# Patient Record
Sex: FEMALE | Race: WHITE | NOT HISPANIC OR LATINO | Employment: OTHER | URBAN - METROPOLITAN AREA
[De-identification: names, ages, dates, MRNs, and addresses within clinical notes are randomized per-mention and may not be internally consistent; named-entity substitution may affect disease eponyms.]

---

## 2022-07-22 ENCOUNTER — LAB REQUISITION (OUTPATIENT)
Dept: LAB | Facility: CLINIC | Age: 87
End: 2022-07-22
Payer: MEDICARE

## 2022-07-22 LAB
ALBUMIN UR-MCNC: NEGATIVE MG/DL
APPEARANCE UR: CLEAR
BILIRUB UR QL STRIP: NEGATIVE
COLOR UR AUTO: ABNORMAL
GLUCOSE UR STRIP-MCNC: NEGATIVE MG/DL
HGB UR QL STRIP: NEGATIVE
KETONES UR STRIP-MCNC: NEGATIVE MG/DL
LEUKOCYTE ESTERASE UR QL STRIP: NEGATIVE
MUCOUS THREADS #/AREA URNS LPF: PRESENT /LPF
NITRATE UR QL: NEGATIVE
PH UR STRIP: 6 [PH] (ref 5–7)
RBC URINE: <1 /HPF
SP GR UR STRIP: 1.02 (ref 1–1.03)
SQUAMOUS EPITHELIAL: 3 /HPF
UROBILINOGEN UR STRIP-MCNC: NORMAL MG/DL
WBC URINE: 1 /HPF

## 2022-07-22 PROCEDURE — 81001 URINALYSIS AUTO W/SCOPE: CPT | Mod: ORL | Performed by: NURSE PRACTITIONER

## 2022-09-24 ENCOUNTER — HOSPITAL ENCOUNTER (OUTPATIENT)
Facility: HOSPITAL | Age: 87
Setting detail: OBSERVATION
Discharge: NURSING, REHAB OR LONG-TERM CARE | End: 2022-09-29
Attending: EMERGENCY MEDICINE | Admitting: FAMILY MEDICINE
Payer: MEDICARE

## 2022-09-24 ENCOUNTER — APPOINTMENT (OUTPATIENT)
Dept: CT IMAGING | Facility: HOSPITAL | Age: 87
End: 2022-09-24
Attending: EMERGENCY MEDICINE
Payer: MEDICARE

## 2022-09-24 ENCOUNTER — APPOINTMENT (OUTPATIENT)
Dept: RADIOLOGY | Facility: HOSPITAL | Age: 87
End: 2022-09-24
Attending: EMERGENCY MEDICINE
Payer: MEDICARE

## 2022-09-24 ENCOUNTER — APPOINTMENT (OUTPATIENT)
Dept: RADIOLOGY | Facility: HOSPITAL | Age: 87
End: 2022-09-24
Attending: STUDENT IN AN ORGANIZED HEALTH CARE EDUCATION/TRAINING PROGRAM
Payer: MEDICARE

## 2022-09-24 DIAGNOSIS — Z79.01 ANTICOAGULATED BY ANTICOAGULATION TREATMENT: ICD-10-CM

## 2022-09-24 DIAGNOSIS — R26.2 UNABLE TO AMBULATE: ICD-10-CM

## 2022-09-24 DIAGNOSIS — M25.552 HIP PAIN, LEFT: ICD-10-CM

## 2022-09-24 DIAGNOSIS — I48.20 CHRONIC ATRIAL FIBRILLATION (H): ICD-10-CM
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
## 2022-09-24 LAB
ALBUMIN UR-MCNC: 10 MG/DL
ANION GAP SERPL CALCULATED.3IONS-SCNC: 10 MMOL/L (ref 7–15)
APPEARANCE UR: CLEAR
APTT PPP: 22 SECONDS (ref 22–38)
BASOPHILS # BLD AUTO: 0.1 10E3/UL (ref 0–0.2)
BASOPHILS NFR BLD AUTO: 0 %
BILIRUB UR QL STRIP: NEGATIVE
BUN SERPL-MCNC: 36.5 MG/DL (ref 8–23)
CALCIUM SERPL-MCNC: 8.9 MG/DL (ref 8.8–10.2)
CHLORIDE SERPL-SCNC: 106 MMOL/L (ref 98–107)
CK SERPL-CCNC: 732 U/L (ref 26–192)
COLOR UR AUTO: ABNORMAL
CREAT BLD-MCNC: 1.6 MG/DL (ref 0.6–1.1)
CREAT SERPL-MCNC: 1.47 MG/DL (ref 0.51–0.95)
DEPRECATED HCO3 PLAS-SCNC: 24 MMOL/L (ref 22–29)
EOSINOPHIL # BLD AUTO: 0 10E3/UL (ref 0–0.7)
EOSINOPHIL NFR BLD AUTO: 0 %
ERYTHROCYTE [DISTWIDTH] IN BLOOD BY AUTOMATED COUNT: 13.9 % (ref 10–15)
GFR SERPL CREATININE-BSD FRML MDRD: 31 ML/MIN/1.73M2
GFR SERPL CREATININE-BSD FRML MDRD: 34 ML/MIN/1.73M2
GLUCOSE SERPL-MCNC: 122 MG/DL (ref 70–99)
GLUCOSE UR STRIP-MCNC: NEGATIVE MG/DL
HCT VFR BLD AUTO: 39.6 % (ref 35–47)
HGB BLD-MCNC: 13 G/DL (ref 11.7–15.7)
HGB UR QL STRIP: ABNORMAL
IMM GRANULOCYTES # BLD: 0.1 10E3/UL
IMM GRANULOCYTES NFR BLD: 1 %
INR PPP: 1.02 (ref 0.85–1.15)
KETONES UR STRIP-MCNC: NEGATIVE MG/DL
LEUKOCYTE ESTERASE UR QL STRIP: NEGATIVE
LYMPHOCYTES # BLD AUTO: 0.7 10E3/UL (ref 0.8–5.3)
LYMPHOCYTES NFR BLD AUTO: 4 %
MCH RBC QN AUTO: 29.5 PG (ref 26.5–33)
MCHC RBC AUTO-ENTMCNC: 32.8 G/DL (ref 31.5–36.5)
MCV RBC AUTO: 90 FL (ref 78–100)
MONOCYTES # BLD AUTO: 1.1 10E3/UL (ref 0–1.3)
MONOCYTES NFR BLD AUTO: 6 %
NEUTROPHILS # BLD AUTO: 16.7 10E3/UL (ref 1.6–8.3)
NEUTROPHILS NFR BLD AUTO: 89 %
NITRATE UR QL: NEGATIVE
NRBC # BLD AUTO: 0 10E3/UL
NRBC BLD AUTO-RTO: 0 /100
PH UR STRIP: 6 [PH] (ref 5–7)
PLATELET # BLD AUTO: 255 10E3/UL (ref 150–450)
POTASSIUM SERPL-SCNC: 4.3 MMOL/L (ref 3.4–5.3)
RBC # BLD AUTO: 4.41 10E6/UL (ref 3.8–5.2)
RBC URINE: 1 /HPF
SARS-COV-2 RNA RESP QL NAA+PROBE: NEGATIVE
SODIUM SERPL-SCNC: 140 MMOL/L (ref 136–145)
SP GR UR STRIP: 1.03 (ref 1–1.03)
TROPONIN T SERPL HS-MCNC: 29 NG/L
TROPONIN T SERPL HS-MCNC: 32 NG/L
TROPONIN T SERPL HS-MCNC: NORMAL NG/L
UROBILINOGEN UR STRIP-MCNC: <2 MG/DL
WBC # BLD AUTO: 18.5 10E3/UL (ref 4–11)
WBC URINE: <1 /HPF

## 2022-09-24 PROCEDURE — 73560 X-RAY EXAM OF KNEE 1 OR 2: CPT | Mod: RT

## 2022-09-24 PROCEDURE — U0003 INFECTIOUS AGENT DETECTION BY NUCLEIC ACID (DNA OR RNA); SEVERE ACUTE RESPIRATORY SYNDROME CORONAVIRUS 2 (SARS-COV-2) (CORONAVIRUS DISEASE [COVID-19]), AMPLIFIED PROBE TECHNIQUE, MAKING USE OF HIGH THROUGHPUT TECHNOLOGIES AS DESCRIBED BY CMS-2020-01-R: HCPCS | Performed by: EMERGENCY MEDICINE

## 2022-09-24 PROCEDURE — 258N000003 HC RX IP 258 OP 636: Performed by: EMERGENCY MEDICINE

## 2022-09-24 PROCEDURE — 93005 ELECTROCARDIOGRAM TRACING: CPT | Performed by: EMERGENCY MEDICINE

## 2022-09-24 PROCEDURE — 250N000013 HC RX MED GY IP 250 OP 250 PS 637: Performed by: STUDENT IN AN ORGANIZED HEALTH CARE EDUCATION/TRAINING PROGRAM

## 2022-09-24 PROCEDURE — 73522 X-RAY EXAM HIPS BI 3-4 VIEWS: CPT

## 2022-09-24 PROCEDURE — 82550 ASSAY OF CK (CPK): CPT | Performed by: EMERGENCY MEDICINE

## 2022-09-24 PROCEDURE — 74174 CTA ABD&PLVS W/CONTRAST: CPT

## 2022-09-24 PROCEDURE — 96361 HYDRATE IV INFUSION ADD-ON: CPT

## 2022-09-24 PROCEDURE — G0378 HOSPITAL OBSERVATION PER HR: HCPCS

## 2022-09-24 PROCEDURE — C9803 HOPD COVID-19 SPEC COLLECT: HCPCS

## 2022-09-24 PROCEDURE — 85610 PROTHROMBIN TIME: CPT | Performed by: EMERGENCY MEDICINE

## 2022-09-24 PROCEDURE — 72125 CT NECK SPINE W/O DYE: CPT

## 2022-09-24 PROCEDURE — 36415 COLL VENOUS BLD VENIPUNCTURE: CPT | Performed by: EMERGENCY MEDICINE

## 2022-09-24 PROCEDURE — 250N000011 HC RX IP 250 OP 636: Performed by: EMERGENCY MEDICINE

## 2022-09-24 PROCEDURE — 99285 EMERGENCY DEPT VISIT HI MDM: CPT | Mod: 25

## 2022-09-24 PROCEDURE — 70450 CT HEAD/BRAIN W/O DYE: CPT

## 2022-09-24 PROCEDURE — 85730 THROMBOPLASTIN TIME PARTIAL: CPT | Performed by: EMERGENCY MEDICINE

## 2022-09-24 PROCEDURE — 85025 COMPLETE CBC W/AUTO DIFF WBC: CPT | Performed by: EMERGENCY MEDICINE

## 2022-09-24 PROCEDURE — 84484 ASSAY OF TROPONIN QUANT: CPT | Performed by: EMERGENCY MEDICINE

## 2022-09-24 PROCEDURE — 96374 THER/PROPH/DIAG INJ IV PUSH: CPT | Mod: 59

## 2022-09-24 PROCEDURE — 82565 ASSAY OF CREATININE: CPT

## 2022-09-24 PROCEDURE — 81001 URINALYSIS AUTO W/SCOPE: CPT | Performed by: EMERGENCY MEDICINE

## 2022-09-24 PROCEDURE — 82310 ASSAY OF CALCIUM: CPT | Performed by: EMERGENCY MEDICINE

## 2022-09-24 PROCEDURE — 71275 CT ANGIOGRAPHY CHEST: CPT

## 2022-09-24 PROCEDURE — 73030 X-RAY EXAM OF SHOULDER: CPT | Mod: LT

## 2022-09-24 PROCEDURE — 73610 X-RAY EXAM OF ANKLE: CPT | Mod: RT

## 2022-09-24 RX ORDER — LEVOTHYROXINE SODIUM 88 UG/1
88 TABLET ORAL DAILY
COMMUNITY

## 2022-09-24 RX ORDER — ACETAMINOPHEN 500 MG
500 TABLET ORAL EVERY 6 HOURS PRN
COMMUNITY

## 2022-09-24 RX ORDER — ONDANSETRON 2 MG/ML
4 INJECTION INTRAMUSCULAR; INTRAVENOUS EVERY 6 HOURS PRN
Status: DISCONTINUED | OUTPATIENT
Start: 2022-09-24 | End: 2022-09-29 | Stop reason: HOSPADM

## 2022-09-24 RX ORDER — PANTOPRAZOLE SODIUM 40 MG/1
40 TABLET, DELAYED RELEASE ORAL DAILY
Status: DISCONTINUED | OUTPATIENT
Start: 2022-09-24 | End: 2022-09-29 | Stop reason: HOSPADM

## 2022-09-24 RX ORDER — BISACODYL 5 MG
10 TABLET, DELAYED RELEASE (ENTERIC COATED) ORAL DAILY PRN
Status: DISCONTINUED | OUTPATIENT
Start: 2022-09-24 | End: 2022-09-29 | Stop reason: HOSPADM

## 2022-09-24 RX ORDER — LIDOCAINE 40 MG/G
CREAM TOPICAL
Status: DISCONTINUED | OUTPATIENT
Start: 2022-09-24 | End: 2022-09-29 | Stop reason: HOSPADM

## 2022-09-24 RX ORDER — BISACODYL 5 MG
5 TABLET, DELAYED RELEASE (ENTERIC COATED) ORAL DAILY PRN
Status: DISCONTINUED | OUTPATIENT
Start: 2022-09-24 | End: 2022-09-29 | Stop reason: HOSPADM

## 2022-09-24 RX ORDER — ONDANSETRON 4 MG/1
4 TABLET, ORALLY DISINTEGRATING ORAL EVERY 6 HOURS PRN
Status: DISCONTINUED | OUTPATIENT
Start: 2022-09-24 | End: 2022-09-29 | Stop reason: HOSPADM

## 2022-09-24 RX ORDER — PANTOPRAZOLE SODIUM 40 MG/1
40 TABLET, DELAYED RELEASE ORAL DAILY
COMMUNITY

## 2022-09-24 RX ORDER — IOPAMIDOL 755 MG/ML
75 INJECTION, SOLUTION INTRAVASCULAR ONCE
Status: COMPLETED | OUTPATIENT
Start: 2022-09-24 | End: 2022-09-24

## 2022-09-24 RX ORDER — ACETAMINOPHEN 325 MG/1
975 TABLET ORAL EVERY 8 HOURS
Status: DISCONTINUED | OUTPATIENT
Start: 2022-09-24 | End: 2022-09-29 | Stop reason: HOSPADM

## 2022-09-24 RX ORDER — ACETAMINOPHEN 500 MG
500 TABLET ORAL 2 TIMES DAILY
Status: ON HOLD | COMMUNITY
End: 2022-09-28

## 2022-09-24 RX ORDER — OXYCODONE HYDROCHLORIDE 5 MG/1
5 TABLET ORAL EVERY 4 HOURS PRN
Status: DISCONTINUED | OUTPATIENT
Start: 2022-09-24 | End: 2022-09-25

## 2022-09-24 RX ORDER — MULTIVITAMIN,THERAPEUTIC
1 TABLET ORAL DAILY
COMMUNITY

## 2022-09-24 RX ORDER — LANOLIN ALCOHOL/MO/W.PET/CERES
1000 CREAM (GRAM) TOPICAL DAILY
COMMUNITY

## 2022-09-24 RX ORDER — HYDROMORPHONE HYDROCHLORIDE 1 MG/ML
0.2 INJECTION, SOLUTION INTRAMUSCULAR; INTRAVENOUS; SUBCUTANEOUS ONCE
Status: COMPLETED | OUTPATIENT
Start: 2022-09-24 | End: 2022-09-24

## 2022-09-24 RX ORDER — LEVOTHYROXINE SODIUM 88 UG/1
88 TABLET ORAL DAILY
Status: DISCONTINUED | OUTPATIENT
Start: 2022-09-24 | End: 2022-09-29 | Stop reason: HOSPADM

## 2022-09-24 RX ADMIN — IOPAMIDOL 75 ML: 755 INJECTION, SOLUTION INTRAVENOUS at 08:25

## 2022-09-24 RX ADMIN — HYDROMORPHONE HYDROCHLORIDE 0.2 MG: 1 INJECTION, SOLUTION INTRAMUSCULAR; INTRAVENOUS; SUBCUTANEOUS at 11:29

## 2022-09-24 RX ADMIN — APIXABAN 2.5 MG: 2.5 TABLET, FILM COATED ORAL at 21:43

## 2022-09-24 RX ADMIN — ACETAMINOPHEN 975 MG: 325 TABLET, FILM COATED ORAL at 21:43

## 2022-09-24 RX ADMIN — SODIUM CHLORIDE 500 ML: 9 INJECTION, SOLUTION INTRAVENOUS at 08:50

## 2022-09-24 ASSESSMENT — ACTIVITIES OF DAILY LIVING (ADL)
ADLS_ACUITY_SCORE: 35
ADLS_ACUITY_SCORE: 38
ADLS_ACUITY_SCORE: 40
ADLS_ACUITY_SCORE: 38
ADLS_ACUITY_SCORE: 35
ADLS_ACUITY_SCORE: 35

## 2022-09-24 ASSESSMENT — ENCOUNTER SYMPTOMS: ARTHRALGIAS: 1

## 2022-09-24 NOTE — ED TRIAGE NOTES
She comes from an assisted living facility where she fell at some point this morning. She is not sure how long she was on the floor before she remember to push the call alert button on her body. She has dementia to some degree at baseline. She is complaining of right shoulder pain, left hip pain right knee pain and right foot pain. She denies neck and spinal pain. Denies abdominal pain. She does not know if there was LOC. she is on blood thinners so trauma alert was called.

## 2022-09-24 NOTE — ED NOTES
Pt ambulated to the bathroom at this time with the assistance of a walker. Pt very slow and unsteady. Pt is also quick to fatigue while walking.

## 2022-09-24 NOTE — PROGRESS NOTES
Chart reviewed, spoke to pt and dtr Carolyn Andrews, pt lives at [facility], unable to return there if she continues to fall (fell twice and was found hours later, unsure how long), dtr Carolyn concern about her returning there if she is unable to get up and use walker on her own due to pain in legs/knees, and would work with facility to increase svcs if she can get to or near baseline to go home w/PT/OT. Discussed SANTANA. Left  w/facility [facility]LIN[facility] Vazquez [phone].

## 2022-09-24 NOTE — PHARMACY-ADMISSION MEDICATION HISTORY
Pharmacy Note - Admission Medication History    Pertinent Provider Information: all information is from MAR from facility ([facility])     ______________________________________________________________________    Prior To Admission (PTA) med list completed and updated in EMR.       PTA Med List   Medication Sig Last Dose     acetaminophen (TYLENOL) 500 MG tablet Take 500 mg by mouth 2 times daily 9/23/2022 at 2100     acetaminophen (TYLENOL) 500 MG tablet Take 500 mg by mouth every 6 hours as needed for pain 9/23/2022 at 1815     apixaban ANTICOAGULANT (ELIQUIS) 2.5 MG tablet Take 2.5 mg by mouth 2 times daily 9/23/2022 at 2100     calcium carbonate 600 mg-vitamin D 400 units (CALTRATE) 600-400 MG-UNIT per tablet Take 1 tablet by mouth 2 times daily 9/23/2022 at 2100     cyanocobalamin (VITAMIN B-12) 1000 MCG tablet Take 1,000 mcg by mouth daily 9/23/2022 at 1100     levothyroxine (SYNTHROID/LEVOTHROID) 88 MCG tablet Take 88 mcg by mouth daily 9/23/2022 at 1100     melatonin 5 MG tablet Take 5 mg by mouth At Bedtime 9/23/2022 at 2100     Menthol, Topical Analgesic, (BIOFREEZE) 4 % GEL Externally apply 1 Application topically 2 times daily To right knee 9/23/2022 at 2100     multivitamin, therapeutic (THERA-VIT) TABS tablet Take 1 tablet by mouth daily 9/23/2022 at 1100     pantoprazole (PROTONIX) 40 MG EC tablet Take 40 mg by mouth daily 9/23/2022 at 1100       Information source(s): Facility ([facility]/) medication list/MAR  Method of interview communication: N/A    Summary of Changes to PTA Med List  New: ALL  Discontinued: bupropion  Changed: none    Patient was asked about OTC/herbal products specifically.  PTA med list reflects this.    In the past week, patient estimated taking medication this percent of the time:  greater than 90%.    Allergies were reviewed, assessed, and updated with the patient.      Patient did not bring any medications to the hospital and can't retrieve from home. No multi-dose  medications are available for use during hospital stay.     The information provided in this note is only as accurate as the sources available at the time of the update(s).    Thank you for the opportunity to participate in the care of this patient.    Jacqueline Miles  9/24/2022 12:59 PM

## 2022-09-24 NOTE — ED PROVIDER NOTES
EMERGENCY DEPARTMENT ENCOUNTER      NAME: Marycarmen Warren  AGE: 87 year old female  YOB: 1935  MRN: [id number]  EVALUATION DATE & TIME: 9/24/2022  7:20 AM    PCP: No primary care provider on file.    ED PROVIDER: Denise Roberts M.D.      CHIEF COMPLAINT     Chief Complaint   Patient presents with     Fall         FINAL IMPRESSION:     1. Fall, initial encounter    2. Chronic atrial fibrillation (H)    3. Anticoagulated by anticoagulation treatment    4. Hip pain, left    5. Unable to ambulate          MEDICAL DECISION MAKING:       Pertinent Labs & Imaging studies reviewed. (See chart for details)    87 year old female presents to the Emergency Department for evaluation of injuries after a fall.    ED Course as of 09/24/22 1523   Sat Sep 24, 2022   0737 Mrs. Warren  is a 86 yo female who presents via EMS for fall.  History by EMS patient and  nursing home.   0738 Patient states she fell.  She thinks she tripped  She states she was down on floor for 2 hours and forgot to use her alarm.   0739 She states she has been doing well no recent chest pain shortness of breath vomiting or diarrhea.   0740 Per EMS patient is on Eliquis.  Her records reveal that she is DNR.  She does have a history of   0740 Nursing home records reveal a history of A. fib on Eliquis history of falling history of chronic kidney disease depression short-term memory loss.   0740 Semination she is nontoxic awake and alert.  She received fentanyl prior to arrival.  No evidence of head trauma no hyphema no hemotympanum no midline cervical tenderness palpation she is in a cervical collar will leave on given age.  She has tenderness palpation of the left shoulder left hip right knee right ankle.  No chest wall or abdominal tenderness palpation no midline thoracic or lumbar things for the patient.   0741 Differential diagnoses include but not limited accidental fall syncope   0741 IV already established by EMS.  Will image head C-spine  chest and abdomen and shoulder hip knee and ankle.  We will contact nursing home and family members.   0906 Daughter at bedside confirms that patient has fallen twice.   0906 Troponin T, High Sensitivity (now)   0907 CT chest no acute trauma.   0911 C-spine cleared clinically and radiologically.  Daughter states that she would like patient to go to some type of rehabilitation.  Will contact care manager with help regarding that.   0940 Elevated white blood cell count.  Creatinine of 1.47.  Troponin T 21 we will repeat in 2 hours.   1027 UA negative.   1102 Attempted to ambulate patient and she is unable to given Tdap complains of left inguinal hip pain spoke with radiology is no gross fracture.  Will admit for PT OT further evaluation of falls.   1129 Admitting team at bedside.   1130 Clinical impression and decision making     1130 Clinical impression and decision making history of atrial fibrillation anticoagulated on Thursday 1 on Friday.  Unknown what happened.  Trauma evaluation negative for acute head neck thoracic abdominal or hip fracture unable to ambulate secondary to hip pain daughter concerned that she is currently only in assisted living and wants her to go to TCU.  Will admit for pain control PT OT and further placement.  Admitted in stable condition.   1311 Addendum on delta troponin is 32 patient denies any chest pain or shortness of breath.  Will be admitted for a third troponin.       Vital Signs: Hypertension  EKG: Irregular baseline.  Imaging: Head CT C-spine CT chest and abdomen no acute trauma.  Shoulder knee and ankle no fracture  Home Meds: reviewee  ED meds/abx: dialudid  Fluids: ns    Labs  K 4.3  Cr 1.47  Wbc 18.5  Hgb 13  Platelets 255      Review of Previous Records        Consults  admitting Dr. Ruffin.  ED COURSE     7:17 AM I met with the patient, obtained history, performed an initial exam, and discussed options and plan for diagnostics and treatment here in the ED.    8:53 AM  Patient's daughter arrived.    11:07 AM I spoke with , Dr. Ruffin.  Meeting physician.    1:12 PM reevaluated and updated.  Currently boarding in the emergency department.  Admitting team at bedside.  Same for care manager.    At the conclusion of the encounter I discussed the results of all of the tests and the disposition. The questions were answered. The patient acknowledged understanding and was agreeable with the care plan.           MEDICATIONS GIVEN IN THE EMERGENCY:     Medications   sodium chloride (PF) 0.9% PF flush 3 mL (has no administration in time range)   apixaban ANTICOAGULANT (ELIQUIS) tablet 2.5 mg (has no administration in time range)   levothyroxine (SYNTHROID/LEVOTHROID) tablet 88 mcg (has no administration in time range)   pantoprazole (PROTONIX) EC tablet 40 mg (has no administration in time range)   lidocaine 1 % 0.1-1 mL (has no administration in time range)   lidocaine (LMX4) cream (has no administration in time range)   sodium chloride (PF) 0.9% PF flush 3 mL (has no administration in time range)   sodium chloride (PF) 0.9% PF flush 3 mL (has no administration in time range)   melatonin tablet 1 mg (has no administration in time range)   Patient is already receiving anticoagulation with heparin, enoxaparin (LOVENOX), warfarin (COUMADIN)  or other anticoagulant medication (has no administration in time range)   acetaminophen (TYLENOL) tablet 975 mg (has no administration in time range)   bisacodyl (DULCOLAX) EC tablet 5 mg (has no administration in time range)     Or   bisacodyl (DULCOLAX) EC tablet 10 mg (has no administration in time range)   ondansetron (ZOFRAN ODT) ODT tab 4 mg (has no administration in time range)     Or   ondansetron (ZOFRAN) injection 4 mg (has no administration in time range)   oxyCODONE (ROXICODONE) tablet 5 mg (has no administration in time range)   0.9% sodium chloride BOLUS (0 mLs Intravenous Stopped 9/24/22 1020)   iopamidol (ISOVUE-370) solution 75 mL (75  mLs Intravenous Given 9/24/22 0830)   HYDROmorphone (PF) (DILAUDID) injection 0.2 mg (0.2 mg Intravenous Given 9/24/22 1129)       NEW PRESCRIPTIONS STARTED AT TODAY'S ER VISIT     New Prescriptions    No medications on file          =================================================================    HPI     Patient information was obtained from: Patient    Use of : N/A    Marycarmen Warren is a 87 year old female who presents by ambulance for evaluation of injuries after a fall.     Per EMS, the patient is coming from [facility] had an unwitnessed fall this morning (9/24) and had been found at least two hours after her fall.  She has cervical spine pain but no neurological deficits. She is currently on Eliquis and was given 50 mg fentanyl for her pain, which decreased her pain from a 6-10 down to a 1-2.     Between Thursday 9/22 and Friday 9/23 it was found that near her kitchen with some stool in the trash can and it appears she has been using trash bags to clean herself. Her daughter had checked in on her around 15:00 on Friday (9/23), and she had fallen again between last night and today.     Per the patient, she had fallen this morning (9/24), and she thinks she tripped but wasn't sure. She had forgotten to press the call button and wasn't helped until a couple hours after the fall. She endorses right shoulder, knee and ankle pain, as well left hip pain. Patient has a history of falls, gait problem, atrial fibrillation, and is anticoagulated. The patient is DNR. No other complaints at this time.      REVIEW OF SYSTEMS   Review of Systems   Musculoskeletal: Positive for arthralgias (right knee, shoulder and ankle pain ).   All other systems reviewed and are negative.       PAST MEDICAL HISTORY:   No past medical history on file.    PAST SURGICAL HISTORY:   No past surgical history on file.      CURRENT MEDICATIONS:   acetaminophen (TYLENOL) 500 MG tablet  acetaminophen (TYLENOL) 500 MG  tablet  apixaban ANTICOAGULANT (ELIQUIS) 2.5 MG tablet  calcium carbonate 600 mg-vitamin D 400 units (CALTRATE) 600-400 MG-UNIT per tablet  cyanocobalamin (VITAMIN B-12) 1000 MCG tablet  levothyroxine (SYNTHROID/LEVOTHROID) 88 MCG tablet  melatonin 5 MG tablet  Menthol, Topical Analgesic, (BIOFREEZE) 4 % GEL  multivitamin, therapeutic (THERA-VIT) TABS tablet  pantoprazole (PROTONIX) 40 MG EC tablet         ALLERGIES:   No Known Allergies    FAMILY HISTORY:   No family history on file.    SOCIAL HISTORY:        VITALS:   BP (!) 155/79   Pulse 81   Temp 97.9  F (36.6  C) (Oral)   Resp 18   SpO2 93%     PHYSICAL EXAM     Physical Exam  Vitals and nursing note reviewed. Exam conducted with a chaperone present.   Constitutional:       Appearance: Normal appearance.   HENT:      Head: Normocephalic and atraumatic.      Ears:      Comments: No hemotympanum  Eyes:      Comments: No hyphema   Musculoskeletal:         General: Tenderness present.      Comments: Right knee, right ankle.  Full range of motion of the knee and the ankle no hemarthrosis.  Left hip tenderness palpation with full range of motion   Neurological:      Mental Status: She is alert.         Physical Exam   Constitutional: Fairly oriented times self on a cervical collar    Head: Atraumatic.     Nose: Nose normal.     Mouth/Throat: Oropharynx is clear and moist.     Eyes: EOM are normal. Pupils are equal, round, and reactive to light.  No hyphema    Ears: No hemotympanum    Neck: Normal range of motion. Neck supple.  No midline cervical tenderness palpation    Cardiovascular: Normal rate, regular rhythm and normal heart sounds.      Pulmonary/Chest: Normal effort  and breath sounds normal.     Abdominal: Nontender    Musculoskeletal:  Right ankle, right knee tenderness. Surgical scar on left knee.  Left hip tenderness but    Neurological: Alert and oriented x2.    Lymphatics: Trace pedal edema.    : NA    Skin: Skin is warm and dry.     Psychiatric:  Normal mood and affect. Behavior is normal.       LAB:     All pertinent labs reviewed and interpreted.  Labs Ordered and Resulted from Time of ED Arrival to Time of ED Departure   BASIC METABOLIC PANEL - Abnormal       Result Value    Sodium 140      Potassium 4.3      Chloride 106      Carbon Dioxide (CO2) 24      Anion Gap 10      Urea Nitrogen 36.5 (*)     Creatinine 1.47 (*)     Calcium 8.9      Glucose 122 (*)     GFR Estimate 34 (*)    CBC WITH PLATELETS AND DIFFERENTIAL - Abnormal    WBC Count 18.5 (*)     RBC Count 4.41      Hemoglobin 13.0      Hematocrit 39.6      MCV 90      MCH 29.5      MCHC 32.8      RDW 13.9      Platelet Count 255      % Neutrophils 89      % Lymphocytes 4      % Monocytes 6      % Eosinophils 0      % Basophils 0      % Immature Granulocytes 1      NRBCs per 100 WBC 0      Absolute Neutrophils 16.7 (*)     Absolute Lymphocytes 0.7 (*)     Absolute Monocytes 1.1      Absolute Eosinophils 0.0      Absolute Basophils 0.1      Absolute Immature Granulocytes 0.1      Absolute NRBCs 0.0     ROUTINE UA WITH MICROSCOPIC REFLEX TO CULTURE - Abnormal    Color Urine Light Yellow      Appearance Urine Clear      Glucose Urine Negative      Bilirubin Urine Negative      Ketones Urine Negative      Specific Gravity Urine 1.032 (*)     Blood Urine 0.2 mg/dL (*)     pH Urine 6.0      Protein Albumin Urine 10  (*)     Urobilinogen Urine <2.0      Nitrite Urine Negative      Leukocyte Esterase Urine Negative      RBC Urine 1      WBC Urine <1     ISTAT CREATININE POCT - Abnormal    Creatinine POCT 1.6 (*)     GFR, ESTIMATED POCT 31 (*)    TROPONIN T, HIGH SENSITIVITY - Abnormal    Troponin T, High Sensitivity 29 (*)    TROPONIN T, HIGH SENSITIVITY - Abnormal    Troponin T, High Sensitivity 32 (*)    CK TOTAL - Abnormal     (*)    INR - Normal    INR 1.02     PARTIAL THROMBOPLASTIN TIME - Normal    aPTT 22     COVID-19 VIRUS (CORONAVIRUS) BY PCR - Normal    SARS CoV2 PCR Negative     TROPONIN  T, HIGH SENSITIVITY    Troponin T, High Sensitivity            RADIOLOGY:     Reviewed all pertinent imaging. Please see official radiology report.  Ankle XR, G/E 3 views, right   Final Result   IMPRESSION:   1.  Normal joint spacing and alignment.   2.  No fracture.   3.  Bone demineralization.      XR Knee Right 1/2 Views   Final Result   IMPRESSION:   1.  No fracture or joint malalignment.   2.  Mild right knee degenerative arthrosis. Mild medial compartment narrowing. Mild tricompartmental marginal osteophytosis. No joint effusion.   3.  Bone demineralization.      XR Shoulder Left 2 Views   Final Result   IMPRESSION:   1.  Partially limited evaluation due to positioning.   2.  No evidence of acute fracture or joint malalignment.   3.  Multiple old healed left rib fractures.   4.  Bone demineralization.         CTA Chest Abdomen Pelvis w Contrast   Final Result   IMPRESSION:   1.  No evidence of acute traumatic injury to the chest, abdomen or pelvis.   2.  There is a large rectocele.   3.  There is a small hiatal hernia.   4.  Cholelithiasis.   5.  Evidence of previous granulomatous disease.         Cervical spine CT w/o contrast   Final Result   IMPRESSION:   HEAD CT:   1.  No acute intracranial process.   2.  Mild to moderate chronic small vessel ischemic disease and generalized brain parenchymal volume loss.      CERVICAL SPINE CT:   1.  Age-indeterminate mild to moderate T2 superior endplate compression fracture.   2.  No fracture or traumatic subluxation of the cervical spine.   3.  Multilevel cervical spondylosis with degenerative spondylolisthesis.         Head CT w/o contrast   Final Result   IMPRESSION:   HEAD CT:   1.  No acute intracranial process.   2.  Mild to moderate chronic small vessel ischemic disease and generalized brain parenchymal volume loss.      CERVICAL SPINE CT:   1.  Age-indeterminate mild to moderate T2 superior endplate compression fracture.   2.  No fracture or traumatic  subluxation of the cervical spine.   3.  Multilevel cervical spondylosis with degenerative spondylolisthesis.         XR Hip Bilateral 1 View Each    (Results Pending)        EKG:     EKG #1   very irregular baseline I see P waves seems sinus with sinus arrhythmia.    Time:093905    Ventricular rate 80 bmp  Axis normal  VT interval 120 ms  QRS duration 86 ms  QT//449 ms    Compared to previous EKG on no previous available for comparison  I have independently reviewed and interpreted the EKG(s) documented above.      PROCEDURES:     Procedures      I, Franco Santoro, am serving as a scribe to document services personally performed by Dr. Roberts based on my observation and the provider's statements to me. I, Denise Roberts MD attest that Franco Santoro is acting in a scribe capacity, has observed my performance of the services and has documented them in accordance with my direction.    Denise Roberts M.D.  Emergency Medicine  [facility] EMERGENCY DEPARTMENT  [facility] [facility] [id number] [phone]  Dept: [phone]     Denise Roberts MD  09/24/22 3985

## 2022-09-24 NOTE — ED NOTES
PT resting comfortably with family at bedside, complains of pain in left shoulder but reports that it is feeling better since pain medications. HTN noted, MD updated.

## 2022-09-25 ENCOUNTER — APPOINTMENT (OUTPATIENT)
Dept: OCCUPATIONAL THERAPY | Facility: HOSPITAL | Age: 87
End: 2022-09-25
Attending: STUDENT IN AN ORGANIZED HEALTH CARE EDUCATION/TRAINING PROGRAM
Payer: MEDICARE

## 2022-09-25 ENCOUNTER — APPOINTMENT (OUTPATIENT)
Dept: CT IMAGING | Facility: HOSPITAL | Age: 87
End: 2022-09-25
Attending: FAMILY MEDICINE
Payer: MEDICARE

## 2022-09-25 ENCOUNTER — APPOINTMENT (OUTPATIENT)
Dept: PHYSICAL THERAPY | Facility: HOSPITAL | Age: 87
End: 2022-09-25
Attending: STUDENT IN AN ORGANIZED HEALTH CARE EDUCATION/TRAINING PROGRAM
Payer: MEDICARE

## 2022-09-25 LAB
ANION GAP SERPL CALCULATED.3IONS-SCNC: 9 MMOL/L (ref 7–15)
BUN SERPL-MCNC: 40.9 MG/DL (ref 8–23)
CALCIUM SERPL-MCNC: 8.5 MG/DL (ref 8.8–10.2)
CHLORIDE SERPL-SCNC: 107 MMOL/L (ref 98–107)
CREAT SERPL-MCNC: 1.26 MG/DL (ref 0.51–0.95)
DEPRECATED HCO3 PLAS-SCNC: 24 MMOL/L (ref 22–29)
ERYTHROCYTE [DISTWIDTH] IN BLOOD BY AUTOMATED COUNT: 14.1 % (ref 10–15)
GFR SERPL CREATININE-BSD FRML MDRD: 41 ML/MIN/1.73M2
GLUCOSE SERPL-MCNC: 106 MG/DL (ref 70–99)
HCT VFR BLD AUTO: 32 % (ref 35–47)
HGB BLD-MCNC: 10.5 G/DL (ref 11.7–15.7)
MCH RBC QN AUTO: 29.3 PG (ref 26.5–33)
MCHC RBC AUTO-ENTMCNC: 32.8 G/DL (ref 31.5–36.5)
MCV RBC AUTO: 89 FL (ref 78–100)
PLATELET # BLD AUTO: 182 10E3/UL (ref 150–450)
POTASSIUM SERPL-SCNC: 3.9 MMOL/L (ref 3.4–5.3)
RBC # BLD AUTO: 3.58 10E6/UL (ref 3.8–5.2)
SODIUM SERPL-SCNC: 140 MMOL/L (ref 136–145)
WBC # BLD AUTO: 8.2 10E3/UL (ref 4–11)

## 2022-09-25 PROCEDURE — 36415 COLL VENOUS BLD VENIPUNCTURE: CPT | Performed by: STUDENT IN AN ORGANIZED HEALTH CARE EDUCATION/TRAINING PROGRAM

## 2022-09-25 PROCEDURE — 70450 CT HEAD/BRAIN W/O DYE: CPT

## 2022-09-25 PROCEDURE — 97161 PT EVAL LOW COMPLEX 20 MIN: CPT | Mod: GP

## 2022-09-25 PROCEDURE — 99204 OFFICE O/P NEW MOD 45 MIN: CPT | Performed by: PHYSICIAN ASSISTANT

## 2022-09-25 PROCEDURE — 82310 ASSAY OF CALCIUM: CPT | Performed by: STUDENT IN AN ORGANIZED HEALTH CARE EDUCATION/TRAINING PROGRAM

## 2022-09-25 PROCEDURE — G0378 HOSPITAL OBSERVATION PER HR: HCPCS

## 2022-09-25 PROCEDURE — 97530 THERAPEUTIC ACTIVITIES: CPT | Mod: GP

## 2022-09-25 PROCEDURE — 250N000013 HC RX MED GY IP 250 OP 250 PS 637: Performed by: STUDENT IN AN ORGANIZED HEALTH CARE EDUCATION/TRAINING PROGRAM

## 2022-09-25 PROCEDURE — 97166 OT EVAL MOD COMPLEX 45 MIN: CPT | Mod: GO

## 2022-09-25 PROCEDURE — 85027 COMPLETE CBC AUTOMATED: CPT | Performed by: STUDENT IN AN ORGANIZED HEALTH CARE EDUCATION/TRAINING PROGRAM

## 2022-09-25 PROCEDURE — 97530 THERAPEUTIC ACTIVITIES: CPT | Mod: GO

## 2022-09-25 PROCEDURE — 99219 PR INITIAL OBSERVATION CARE,LEVEL II: CPT | Mod: GC | Performed by: STUDENT IN AN ORGANIZED HEALTH CARE EDUCATION/TRAINING PROGRAM

## 2022-09-25 RX ORDER — LIDOCAINE 4 G/G
1 PATCH TOPICAL
Status: DISCONTINUED | OUTPATIENT
Start: 2022-09-25 | End: 2022-09-29 | Stop reason: HOSPADM

## 2022-09-25 RX ORDER — ACETAMINOPHEN 325 MG/1
325 TABLET ORAL EVERY 4 HOURS PRN
Status: DISCONTINUED | OUTPATIENT
Start: 2022-09-25 | End: 2022-09-29 | Stop reason: HOSPADM

## 2022-09-25 RX ADMIN — APIXABAN 2.5 MG: 2.5 TABLET, FILM COATED ORAL at 19:39

## 2022-09-25 RX ADMIN — LIDOCAINE PATCH 4% 1 PATCH: 40 PATCH TOPICAL at 13:15

## 2022-09-25 RX ADMIN — ACETAMINOPHEN 975 MG: 325 TABLET, FILM COATED ORAL at 14:56

## 2022-09-25 RX ADMIN — LEVOTHYROXINE SODIUM 88 MCG: 88 TABLET ORAL at 10:02

## 2022-09-25 RX ADMIN — ACETAMINOPHEN 975 MG: 325 TABLET, FILM COATED ORAL at 06:06

## 2022-09-25 RX ADMIN — PANTOPRAZOLE SODIUM 40 MG: 40 TABLET, DELAYED RELEASE ORAL at 10:02

## 2022-09-25 RX ADMIN — OXYCODONE HYDROCHLORIDE 5 MG: 5 TABLET ORAL at 10:09

## 2022-09-25 RX ADMIN — ACETAMINOPHEN 975 MG: 325 TABLET, FILM COATED ORAL at 22:16

## 2022-09-25 RX ADMIN — APIXABAN 2.5 MG: 2.5 TABLET, FILM COATED ORAL at 10:01

## 2022-09-25 ASSESSMENT — ACTIVITIES OF DAILY LIVING (ADL)
ADLS_ACUITY_SCORE: 40
ADLS_ACUITY_SCORE: 39
ADLS_ACUITY_SCORE: 35
ADLS_ACUITY_SCORE: 39
ADLS_ACUITY_SCORE: 40
ADLS_ACUITY_SCORE: 35
ADLS_ACUITY_SCORE: 39
ADLS_ACUITY_SCORE: 40
ADLS_ACUITY_SCORE: 35

## 2022-09-25 NOTE — PLAN OF CARE
"PRIMARY DIAGNOSIS: \"GENERIC\" NURSING  OUTPATIENT/OBSERVATION GOALS TO BE MET BEFORE DISCHARGE:  ADLs back to baseline: No    Activity and level of assistance: Up with maximum assistance. Consider SW and/or PT evaluation.     Pain status: Improved-controlled with oral pain medications.    Return to near baseline physical activity: No     Discharge Planner Nurse   Safe discharge environment identified: No  Barriers to discharge: Yes       Entered by: Myles Reid RN 09/25/2022 3:50 PM     Please review provider order for any additional goals.   Nurse to notify provider when observation goals have been met and patient is ready for discharge.Goal Outcome Evaluation:                      "

## 2022-09-25 NOTE — PLAN OF CARE
PRIMARY DIAGNOSIS: GENERALIZED WEAKNESS    OUTPATIENT/OBSERVATION GOALS TO BE MET BEFORE DISCHARGE  1. Orthostatic performed: No    2. Tolerating PO medications: Yes    3. Return to near baseline physical activity: No    4. Cleared for discharge by consultants (if involved): No    Discharge Planner Nurse   Safe discharge environment identified: No  Barriers to discharge: Yes       Entered by: Ana Rainey RN 09/25/2022 1:04 AM   Awaiting PT/OT consults. Ax1-2 with transfers and ambulation, pt weak and unsteady. Requires use of gait belt and walker. Orientation fluctuates to place and time, needs reorientation and frequent reminders.   Please review provider order for any additional goals.   Nurse to notify provider when observation goals have been met and patient is ready for discharge.Goal Outcome Evaluation:

## 2022-09-25 NOTE — PLAN OF CARE
[facility]      OUTPATIENT OCCUPATIONAL THERAPY  EVALUATION  PLAN OF TREATMENT FOR OUTPATIENT REHABILITATION  (COMPLETE FOR INITIAL CLAIMS ONLY)  Patient's Last Name, First Name, M.I.  YOB: 1935  Marycarmen Warren                          Provider's Name  [facility] Medical Record No.  [id number]                               Onset Date:  09/24/22   Start of Care Date:  09/25/22     Type:     ___PT   _X_OT   ___SLP Medical Diagnosis:  Fall, weakness, impaired ADLs                        OT Diagnosis:  Marycarmen Warren is a 87 year old female admitted on 9/24/2022. She has a history of A. fib on anticoagulation and hypothyroidism and is admitted after a mechanical fall.   Visits from SOC:  1   _________________________________________________________________________________  Plan of Treatment/Functional Goals    Planned Interventions: ADL retraining, balance training, strengthening, ROM, cognition, home program guidelines, transfer training, progressive activity/exercise   Goals: See Occupational Therapy Goals on Care Plan in [facility] electronic health record.    Therapy Frequency: 3 times/wk  Predicted Duration of Therapy Intervention: 09/29/22  _________________________________________________________________________________    I CERTIFY THE NEED FOR THESE SERVICES FURNISHED UNDER        THIS PLAN OF TREATMENT AND WHILE UNDER MY CARE     (Physician co-signature of this document indicates review and certification of the therapy plan).              Certification date from: 09/25/22, Certification date to: 09/29/22    Referring Physician: Sintia Ruffin MD            Initial Assessment        See Occupational Therapy evaluation dated 09/25/22 in Epic electronic health record.        Bia Vaz OTR/L  9/25/2022

## 2022-09-25 NOTE — PLAN OF CARE
PRIMARY DIAGNOSIS: GENERALIZED WEAKNESS    OUTPATIENT/OBSERVATION GOALS TO BE MET BEFORE DISCHARGE  1. Orthostatic performed: No, pt unable to d/t pain    2. Tolerating PO medications: Yes    3. Return to near baseline physical activity: No    4. Cleared for discharge by consultants (if involved): No    Discharge Planner Nurse   Safe discharge environment identified: No  Barriers to discharge: Yes       Entered by: Isis Skaggs RN 09/25/2022 12:21 PM     Please review provider order for any additional goals.   Nurse to notify provider when observation goals have been met and patient is ready for discharge.

## 2022-09-25 NOTE — CONSULTS
[facility]    Neurosurgery Consultation     Date of Admission:  9/24/2022  Date of Consult (When I saw the patient): 09/25/22    Assessment & Plan   Marycarmen Warren is a 87 year old female who was admitted on 9/24/2022. I was asked to see the patient for generalized pain status post fall.    Active Problems:  T2 fracture     Plan:   No brace indicated  Okay to increase activity as tolerated with PT/OT  May need further workup of severe left hip pain with CT pelvis vs hip xrays  Will follow peripherally as there is no current neurosurgical intervention indicated   Would benefit from outpatient DEXA scan and treatment of possible osteoporosis       I have discussed the following assessment and plan with Dr. Yates who is in agreement with initial plan and will follow up with further consultation recommendations.    JAQUELINE Pna [facility] Neurosurgery  O: [phone]        Code Status    No CPR- Do NOT Intubate    Reason for Consult   Reason for consult: I was asked by Dr. Kuhn to evaluate this patient for back pain status post fall T2 fracture     Primary Care Physician   Gabe Caban MD    Chief Complaint   Left shoulder and hip pain generalized weakness     History is obtained from the patient    History of Present Illness   Marycarmen Warren is a 87 year old female who presents with history of A. fib on anticoagulation and hypothyroidism presented to ED via ambulance after a mechanical fall Saturday. She is present with her daughter and daughter states that she has had two falls recently one Friday and one Saturday. She is unsure how to she fell and feels that she might of misstepped. She states that she fell backwards and has a life alert but forgot to hit the button. Her daughter worries that her memory is getting worst and needs to be moved to a higher level of care in her facility. After her fall on Saturday she was on the floor for many hours and notes to  have CK of 732. She has complaint of pain all over and is very sensative to touch on her left upper and lower extremity. She denies any neck or upper thoracic pain presently. She has complaint of severe left shoulder and left hip joint pain and tenderness with limited ROM. She denies any headaches, nausea, emesis, or visual changes. She denies any changes in bowel or bladder habits since the fall. She has worsen depends for many years and daughter states that over the past few months she has been going through her supply quicker.       Past Medical History   I have reviewed this patient's medical history and updated it with pertinent information if needed.   No past medical history on file.    Past Surgical History   I have reviewed this patient's surgical history and updated it with pertinent information if needed.  No past surgical history on file.    Prior to Admission Medications   Prior to Admission Medications   Prescriptions Last Dose Informant Patient Reported? Taking?   Menthol, Topical Analgesic, (BIOFREEZE) 4 % GEL 9/23/2022 at 2100  Yes Yes   Sig: Externally apply 1 Application topically 2 times daily To right knee   acetaminophen (TYLENOL) 500 MG tablet 9/23/2022 at 2100  Yes Yes   Sig: Take 500 mg by mouth 2 times daily   acetaminophen (TYLENOL) 500 MG tablet 9/23/2022 at 1815  Yes Yes   Sig: Take 500 mg by mouth every 6 hours as needed for pain   apixaban ANTICOAGULANT (ELIQUIS) 2.5 MG tablet 9/23/2022 at 2100  Yes Yes   Sig: Take 2.5 mg by mouth 2 times daily   calcium carbonate 600 mg-vitamin D 400 units (CALTRATE) 600-400 MG-UNIT per tablet 9/23/2022 at 2100  Yes Yes   Sig: Take 1 tablet by mouth 2 times daily   cyanocobalamin (VITAMIN B-12) 1000 MCG tablet 9/23/2022 at 1100  Yes Yes   Sig: Take 1,000 mcg by mouth daily   levothyroxine (SYNTHROID/LEVOTHROID) 88 MCG tablet 9/23/2022 at 1100  Yes Yes   Sig: Take 88 mcg by mouth daily   melatonin 5 MG tablet 9/23/2022 at 2100  Yes Yes   Sig: Take 5 mg  "by mouth At Bedtime   multivitamin, therapeutic (THERA-VIT) TABS tablet 9/23/2022 at 1100  Yes Yes   Sig: Take 1 tablet by mouth daily   pantoprazole (PROTONIX) 40 MG EC tablet 9/23/2022 at 1100  Yes Yes   Sig: Take 40 mg by mouth daily      Facility-Administered Medications: None     Allergies   No Known Allergies    Social History   I have reviewed this patient's social history and updated it with pertinent information if needed. Marycarmen Warren      Family History   Family history reviewed with patient and is noncontributory.    Review of Systems   14 point review of systems negative with exception to HPI     Physical Exam   Temp: 98.3  F (36.8  C) Temp src: Oral BP: (!) 148/68 Pulse: 71   Resp: 18 SpO2: 97 % O2 Device: None (Room air)    Vital Signs with Ranges  Temp:  [98.3  F (36.8  C)-99  F (37.2  C)] 98.3  F (36.8  C)  Pulse:  [71-87] 71  Resp:  [16-18] 18  BP: (124-148)/(61-70) 148/68  SpO2:  [96 %-97 %] 97 %  161 lbs 6.03 oz     , Blood pressure (!) 148/68, pulse 71, temperature 98.3  F (36.8  C), temperature source Oral, resp. rate 18, height 1.676 m (5' 6\"), weight 73.2 kg (161 lb 6 oz), SpO2 97 %.  161 lbs 6.03 oz  HEENT:  Normocephalic, atraumatic.  PERRLA.  EOM s intact.   Neck:  Supple, non-tender, without lymphadenopathy.  Heart:  No peripheral edema  Lungs:  No SOB  Abdomen:  Soft, non-tender, non-distended.  Normal bowel sounds.  Skin:  Warm and dry, good capillary refill.  Extremities:  Good radial and dorsalis pedis pulses bilaterally, no edema, cyanosis or clubbing.    NEUROLOGICAL EXAMINATION:   Mental status:  Alert and Oriented x 3, speech is fluent.  Cranial nerves:  II-XII intact.   Motor:  Strength is 5/5 throughout the upper and lower extremities  Deltoids:  Right: 5/5   Left:  5/5  Biceps:  Right: 5/5   Left:  5/5  Triceps: Right: 5/5   Left:  5/5                       Wrist Extensors: Right: 5/5   Left:  5/5        Wrist Flexors:Right: 5/5   Left:  5/5             Hand : Right: 5/5 "   Left:  5/5         Hip Flexor: Right: 5/5   Left:  5/5                 Hip Adductor:Right: 5/5   Left:  5/5               Hip Abductor: Right: 5/5   Left:  5/5              Gastroc Soleus:Right: 5/5   Left:  5/5        Tib/Ant:Right: 5/5   Left:  5/5                        EHL:Right: 5/5   Left:  5/5                     Sensation:  Intact to LT throughout   Reflexes:  Negative Babinski.  Negative Clonus.    Coordination:  Smooth finger to nose testing.   Negative pronator drift.   Gait:  Not tested     Cervical examination reveals good range of motion.  No tenderness to palpation of the cervical spine or paraspinous muscles bilaterally.     Lumbar examination reveals no tenderness of the spine or paraspinous muscles.       Limited ROM passive and active in left shoulder and left hip with pain with straight leg raise on left into hip  Pain to palpation of left shoulder and hip, pain to palpation of feet bilaterally (L>R)      Data     CBC RESULTS:   Recent Labs   Lab Test 09/25/22  0750   WBC 8.2   RBC 3.58*   HGB 10.5*   HCT 32.0*   MCV 89   MCH 29.3   MCHC 32.8   RDW 14.1        Basic Metabolic Panel:  Lab Results   Component Value Date     09/25/2022      Lab Results   Component Value Date    POTASSIUM 3.9 09/25/2022     Lab Results   Component Value Date    CHLORIDE 107 09/25/2022     Lab Results   Component Value Date    EDMUNDO 8.5 09/25/2022     Lab Results   Component Value Date    CO2 24 09/25/2022     Lab Results   Component Value Date    BUN 40.9 09/25/2022     Lab Results   Component Value Date    CR 1.26 09/25/2022     Lab Results   Component Value Date     09/25/2022     INR:  Lab Results   Component Value Date    INR 1.02 09/24/2022     CT head and cervical spine reviewed with noted Age-indeterminate mild to moderate T2 superior endplate compression fracture otherwise no high grade spinal stenosis of acute cranial abnormalities   Repeat head CT stable without acute abnormalities      Daisy Graham PA-C  [facility]  O: [phone]

## 2022-09-25 NOTE — PLAN OF CARE
PRIMARY DIAGNOSIS: GENERALIZED WEAKNESS    OUTPATIENT/OBSERVATION GOALS TO BE MET BEFORE DISCHARGE  1. Orthostatic performed: N/A    2. Tolerating PO medications: Yes    3. Return to near baseline physical activity: No    4. Cleared for discharge by consultants (if involved): No    Discharge Planner Nurse   Safe discharge environment identified: Yes  Barriers to discharge: Yes       Entered by: Candida Nuñez RN 09/24/2022 9:18 PM     Please review provider order for any additional goals.   Nurse to notify provider when observation goals have been met and patient is ready for discharge.Goal Outcome Evaluation:

## 2022-09-25 NOTE — PROGRESS NOTES
Patient was settled in room. Vitals remain stable. Comfort promoted through the use of scheduled tylenol for hip pain.

## 2022-09-25 NOTE — PROGRESS NOTES
09/25/22 0830   Quick Adds   Type of Visit Initial Occupational Therapy Evaluation   Living Environment   People in Home alone   Current Living Arrangements assisted living   Home Accessibility no concerns   Living Environment Comments No concerns with as pt in [employer], uses a shower chair.   Self-Care   Usual Activity Tolerance poor   Current Activity Tolerance moderate   Regular Exercise No   Equipment Currently Used at Home walker, standard   Fall history within last six months yes   Number of times patient has fallen within last six months 2   Activity/Exercise/Self-Care Comment Per patient is supervision to set up with dressing and min A for bathing. Staff assist as needed with BADLs but she does alot of it on her own. Pt is somewhat of a poor historian, accuracy on information questionable.   Instrumental Activities of Daily Living (IADL)   IADL Comments Pt is dependent at baseline for all IADLs.   General Information   Onset of Illness/Injury or Date of Surgery 09/24/22   Referring Physician Sintia Ruffin MD   Patient/Family Therapy Goal Statement (OT) Daughter's goal is for pt to attend TCU to increase strength and then considering higher level of care if needed.   Existing Precautions/Restrictions fall;other (see comments)  (pain)   Limitations/Impairments safety/cognitive   General Observations and Info Pain limiting full engagement in assessment and treatment. RN notified. Per pt report, she is below baseline for ADLs. At baseline is min A to supervision with ADLs. Currently at max A for all BADLs due to pain and weakness.   Cognitive Status Examination   Orientation Status person;place;other (see comments)   Cognitive Status Comments Difficulty with instructions provided, needs 1 step instructions. Generally able to answer questions but cognition fluccuates during session. Reports the accurate hospital for location but then assumes incorrect city. Will need to check accuracy of information provided by  pt.   Cognitive Screens/Assessments   Cognitive Assessments Completed [facility] Mental Status Exam (UMS):  Total Score out of /30 18/30   [facility] Norms 1-20 equals dementia   [facility] Domains assessed: orientation, memory, attention, executive functions   SLUMS Interpretation Pt currently needs max A for cognition and cues for BADLs. Pt requires 24 hour supervision at this time. Cognition may improve in familiar environment for functional participation.   Visual Perception   Visual Impairment/Limitations WFL   Posture   Posture Comments Kyphotic posture   Range of Motion Comprehensive   Comment, General Range of Motion ROM limited by pain for shoulders.   Strength Comprehensive (MMT)   Comment, General Manual Muscle Testing (MMT) Assessment MMT was 3+/5 due to weakness and pain.   Bed Mobility   Comment (Bed Mobility) Max A of 1-2 for bed mobility. Max A of 1 getting to EOB from supine and Max A of 2 for getting into bed.   Transfers   Transfer Comments Max A of 1 for sit to stand.   Clinical Impression   Criteria for Skilled Therapeutic Interventions Met (OT) Yes, treatment indicated   OT Diagnosis Marycarmen Warren is a 87 year old female admitted on 9/24/2022. She has a history of A. fib on anticoagulation and hypothyroidism and is admitted after a mechanical fall.   OT Problem List-Impairments impacting ADL problems related to;activity tolerance impaired;balance;cognition;mobility;strength;range of motion (ROM)   Assessment of Occupational Performance 3-5 Performance Deficits   Planned Therapy Interventions (OT) ADL retraining;balance training;strengthening;ROM;cognition;home program guidelines;transfer training;progressive activity/exercise   Risk & Benefits of therapy have been explained evaluation/treatment results reviewed;patient   Clinical Impression Comments Pt is max A of 1-2 for all ADLs. Pt is not at baseline per her report.   OT Discharge Planning   OT Discharge Recommendation (DC Rec)  Transitional Care Facility;Long term care facility;home with home care occupational therapy   OT Rationale for DC Rec Pt lives in nursing home, would need max A of 1-2 for all functional mobility and BADLs. TCU would be an appropriate option to return to PLOF of min A to supervision with BADLs.   Therapy Certification   Start of Care Date 09/25/22   Certification date from 09/25/22   Certification date to 09/29/22   Medical Diagnosis Fall, weakness, impaired ADLs   Total Evaluation Time (Minutes)   Total Evaluation Time (Minutes) 20   OT Goals   Therapy Frequency (OT) 3 times/wk   OT Predicted Duration/Target Date for Goal Attainment 09/29/22   OT Goals Upper Body Dressing;Lower Body Dressing;Toilet Transfer/Toileting   OT: Upper Body Dressing Minimal assist   OT: Lower Body Dressing Minimal assist   OT: Toilet Transfer/Toileting Minimal assist     Bia Vaz OTR/L 9/25/2022

## 2022-09-25 NOTE — PROGRESS NOTES
09/25/22 1530   Quick Adds   Quick Adds Certification   Type of Visit Initial PT Evaluation   Living Environment   People in Home alone   Current Living Arrangements assisted living   Home Accessibility no concerns   Self-Care   Usual Activity Tolerance moderate   Current Activity Tolerance poor   Regular Exercise No  (pt is supposed to be getting exercises 2x/wk but has not been per dtr.)   Equipment Currently Used at Home walker, rolling;wheelchair, manual  (FWW and 4WW)   Fall history within last six months yes   Number of times patient has fallen within last six months 2   General Information   Onset of Illness/Injury or Date of Surgery 09/24/22   Referring Physician Sintia Ruffin MD   Patient/Family Therapy Goals Statement (PT) dtr wants pt to get stronger before returning to [facility], may need TCU   Pertinent History of Current Problem (include personal factors and/or comorbidities that impact the POC) She has a history of A. fib on anticoagulation and hypothyroidism and is admitted after a Mechanical fall.   T2 compression fracture, age undetermined   Existing Precautions/Restrictions spinal  (no brace needed)   Pain Assessment   Patient Currently in Pain Yes, see Vital Sign flowsheet  (does not rate)   Range of Motion (ROM)   Range of Motion ROM is WFL   Strength (Manual Muscle Testing)   Strength (Manual Muscle Testing) Deficits observed during functional mobility   Bed Mobility   Bed Mobility supine-sit   Supine-Sit Conifer (Bed Mobility) maximum assist (25% patient effort);2 person assist   Bed Mobility Limitations decreased ability to use arms for pushing/pulling;decreased ability to use legs for bridging/pushing;impaired ability to control trunk for mobility   Impairments Contributing to Impaired Bed Mobility pain   Assistive Device (Bed Mobility) bed rails;draw sheet   Comment, (Bed Mobility) inc pain   Transfers   Transfers sit-stand transfer   Sit-Stand Transfer   Sit-Stand Conifer  (Transfers) minimum assist (75% patient effort);moderate assist (50% patient effort);2 person assist   Assistive Device (Sit-Stand Transfers) other (see comments)  (arm in arm)   Comment, (Sit-Stand Transfer) inc pain   Balance   Balance other (describe)   Balance Comments posteriro and R lean when sitting EOB   Clinical Impression   Criteria for Skilled Therapeutic Intervention Yes, treatment indicated   PT Diagnosis (PT) impaired functional mobility, gait abnormality   Influenced by the following impairments decreased strength, decreased endurance, pain   Functional limitations due to impairments gait, bed mobility, transfers   Clinical Presentation (PT Evaluation Complexity) Stable/Uncomplicated   Clinical Presentation Rationale pt presents as medically diagnosed   Clinical Decision Making (Complexity) low complexity   Planned Therapy Interventions (PT) balance training;bed mobility training;gait training;home exercise program;neuromuscular re-education;patient/family education;strengthening;transfer training   Anticipated Equipment Needs at Discharge (PT) walker, rolling;wheelchair   Risk & Benefits of therapy have been explained evaluation/treatment results reviewed;patient   PT Discharge Planning   PT Discharge Recommendation (DC Rec) Transitional Care Facility   PT Rationale for DC Rec assist of 2 for transfer EOB > chair. To d/c back to senior care, pt would need two assist for all mobility   Therapy Certification   Start of care date 09/25/22   Certification date from 09/25/22   Certification date to 10/02/22   Medical Diagnosis fall, weakness   Total Evaluation Time   Total Evaluation Time (Minutes) 10   Physical Therapy Goals   PT Frequency 5x/week   PT Predicted Duration/Target Date for Goal Attainment 10/02/22   PT Goals Bed Mobility;Transfers;Gait;PT Goal 1   PT: Bed Mobility Minimal assist;Supine to/from sit;Within precautions;Rolling   PT: Transfers Minimal assist;Sit to/from stand;Bed to/from  chair;Assistive device;Within precautions   PT: Gait Minimal assist;Assistive device;Rolling walker;Within precautions;10 feet   PT: Goal 1 SBA BLE ex to improve strength and endurance needed for functional mobility

## 2022-09-25 NOTE — PLAN OF CARE
"[facility] ED Handoff Report    ED Chief Complaint: Fall    ED Diagnosis:  (W19.XXXA) Fall, initial encounter  Comment: Left side from shoulder down to hip is painful  Plan: pain meds, PT/OT    (I48.20) Chronic atrial fibrillation (H)  Comment: On Eliquis  Plan: CT head neg X2    (Z79.01) Anticoagulated by anticoagulation treatment  Comment:   Plan:     (M25.552) Hip pain, left  Comment: No fracture  Plan: meds, PT/OT    (R26.2) Unable to ambulate  Comment:   Plan:        PMH:  No past medical history on file.     Code Status:  No CPR- Do NOT Intubate     Falls Risk: Yes Band: Applied    Current Living Situation/Residence: lives in an assisted living facility     Elimination Status: Continent: wears briefs     Activity Level: 2 assist    Patients Preferred Language:  English     Needed: No    Vital Signs:  BP (!) 148/68   Pulse 71   Temp 98.3  F (36.8  C) (Oral)   Resp 18   Ht 1.676 m (5' 6\")   Wt 73.2 kg (161 lb 6 oz)   SpO2 97%   BMI 26.05 kg/m       Cardiac Rhythm: irregular    Pain Score: 10/10    Is the Patient Confused:  Yes    Last Food or Drink: 09/25/22 at 1400    Focused Assessment:      Tests Performed: Done: Labs and Imaging    Treatments Provided:  Lido patch on left shoulder    Family Dynamics/Concerns: No    Family Updated On Visitor Policy: Yes    Plan of Care Communicated to Family: Yes    Who Was Updated about Plan of Care: Patient's daughter    Belongings Checklist Done and Signed by Patient: No    Medications sent with patient: insulin    Covid: asymptomatic , negative    Additional Information:     RN: Isis Skaggs RN   9/25/2022 2:21 PM                             "

## 2022-09-25 NOTE — PROGRESS NOTES
[facility]      OUTPATIENT PHYSICAL THERAPY EVALUATION  PLAN OF TREATMENT FOR OUTPATIENT REHABILITATION  (COMPLETE FOR INITIAL CLAIMS ONLY)  Patient's Last Name, First Name, M.I.  YOB: 1935  Marycarmen Warren                        Provider's Name  [facility] Medical Record No.  [id number]                               Onset Date:  09/24/22   Start of Care Date:  09/25/22      Type:     _X_PT   ___OT   ___SLP Medical Diagnosis:  fall, weakness                        PT Diagnosis:  impaired functional mobility, gait abnormality   Visits from SOC:  1   _________________________________________________________________________________  Plan of Treatment/Functional Goals    Planned Interventions: balance training, bed mobility training, gait training, home exercise program, neuromuscular re-education, patient/family education, strengthening, transfer training     Goals: See Physical Therapy Goals on Care [facility].    Therapy Frequency: 5x/week  Predicted Duration of Therapy Intervention: 10/02/22  _________________________________________________________________________________    I CERTIFY THE NEED FOR THESE SERVICES FURNISHED UNDER        THIS PLAN OF TREATMENT AND WHILE UNDER MY CARE     (Physician co-signature of this document indicates review and certification of the therapy plan).              Certification date from: 09/25/22, Certification date to: 10/02/22    Referring Physician: Sintia Ruffin MD/Santos Campa MD           Initial Assessment        See Physical Therapy evaluation dated 09/25/22 in Epic electronic health record.

## 2022-09-25 NOTE — PROGRESS NOTES
[facility]    Progress Note - Hospitalist Service       Date of Admission:  9/24/2022    Assessment & Plan                 Marycarmen Warren is a 87 year old female admitted on 9/24/2022. She has a history of A. fib on anticoagulation and hypothyroidism and is admitted after a mechanical fall.    Mechanical fall  T2 compression fracture, age undetermined  Patient with a fall the day prior to admission, and a fall on the day of admission.  Per patient she tripped and fell after leaving the bathroom.  Per daughter patient has a life alert button, however she forgot to push it.  Daughter is concerned about some memory problems, currently living at [facility].  CK of 732 on admission.  X-rays of left shoulder, right knee and right ankle, pelvis without fracture.  CT head and spine show T2 compression fracture with age indeterminate. No other abnormalities, CT head stable on repeat today.  -PT/OT, OT to perform SLUMs  -Schedule Tylenol, as needed oxycodone 2.5 to 5 mg available, lidocaine patches  -Neurosurgery consulted due to compression fracture, appreciate recommendations  -Fall precautions     RJ versus CKD  Elevated CK  Patient with creatinine of 1.47, unable to see previous records, so uncertain what her baseline creatinine function is.  Trending today. CK elevated to 732, could be contributing to RJ as well.  If RJ, likely secondary to lack of intake due to fall.  -BMP in a.m.    Chronic medical conditions  Hypothyroidism: PTA levothyroxine  GERD: PTA Protonix  A. Fib: PTA Xarelto      Diet: Combination Diet Regular Diet Adult    DVT Prophylaxis: DOAC  Gonzales Catheter: Not present  Fluids: none  Central Lines: None  Cardiac Monitoring: None  Code Status: No CPR- Do NOT Intubate      Disposition Plan      Expected Discharge Date: 09/26/2022    Discharge Delays: Placement - TCU            The patient's care was discussed with the Attending Physician, Dr. Campa.    Negar Regalado,  "MD  Hospitalist Service  [facility]  Securely message with the [employer] Mobile Web Console (learn more here)  Text page via [employer] Paging/Directory         Clinically Significant Risk Factors Present on Admission               # Coagulation Defect: home medication list includes an anticoagulant medication      # Overweight: Estimated body mass index is 26.05 kg/m  as calculated from the following:    Height as of this encounter: 1.676 m (5' 6\").    Weight as of this encounter: 73.2 kg (161 lb 6 oz).        ______________________________________________________________________    Interval History   No acute events overnight.  Marycarmen continues to be fairly uncomfortable, though is responsive to medications.  Medications do make her slightly drowsy. Will try to transition to nonnarcotic options.  Daughter is at bedside, states Marycarmen is at baseline for mental function, does state that she is weaker than previous.  Hope would be to transition to a TCU, before returning to her assisted living facility.    Data reviewed today: I reviewed all medications, new labs and imaging results over the last 24 hours. I personally reviewed the CT head, Xray image(s) showing no acute changes.    Physical Exam   Vital Signs: Temp: 98.3  F (36.8  C) Temp src: Oral BP: (!) 148/68 Pulse: 71   Resp: 18 SpO2: 97 % O2 Device: None (Room air)    Weight: 161 lbs 6.03 oz    Constitutional: awake, alert, cooperative, no apparent distress, and appears stated age  Eyes: Lids and lashes normal, pupils equal, round and reactive to light, extra ocular muscles intact, sclera clear, conjunctiva normal  Respiratory: No increased work of breathing, good air exchange, clear to auscultation bilaterally, no crackles or wheezing  Cardiovascular: Normal apical impulse, regular rate and rhythm, normal S1 and S2, no S3 or S4, and no murmur noted  Skin: no bruising or bleeding  Musculoskeletal: +1 pitting edema on shins bilaterally, tender to " touch.    No erythema or swelling noted.  Neurologic: Awake, alert, oriented to name, place and time.   Neuropsychiatric: General: normal, calm and normal eye contact    Data    Recent Results (from the past 24 hour(s))   Basic metabolic panel    Collection Time: 09/25/22  7:50 AM   Result Value Ref Range    Sodium 140 136 - 145 mmol/L    Potassium 3.9 3.4 - 5.3 mmol/L    Chloride 107 98 - 107 mmol/L    Carbon Dioxide (CO2) 24 22 - 29 mmol/L    Anion Gap 9 7 - 15 mmol/L    Urea Nitrogen 40.9 (H) 8.0 - 23.0 mg/dL    Creatinine 1.26 (H) 0.51 - 0.95 mg/dL    Calcium 8.5 (L) 8.8 - 10.2 mg/dL    Glucose 106 (H) 70 - 99 mg/dL    GFR Estimate 41 (L) >60 mL/min/1.73m2   CBC with platelets    Collection Time: 09/25/22  7:50 AM   Result Value Ref Range    WBC Count 8.2 4.0 - 11.0 10e3/uL    RBC Count 3.58 (L) 3.80 - 5.20 10e6/uL    Hemoglobin 10.5 (L) 11.7 - 15.7 g/dL    Hematocrit 32.0 (L) 35.0 - 47.0 %    MCV 89 78 - 100 fL    MCH 29.3 26.5 - 33.0 pg    MCHC 32.8 31.5 - 36.5 g/dL    RDW 14.1 10.0 - 15.0 %    Platelet Count 182 150 - 450 10e3/uL       Recent Results (from the past 24 hour(s))   XR Hip Bilateral 1 View Each    Narrative    EXAM: XR PELVIS WITH 2 VIEW HIPS BILATERAL  LOCATION: [facility]  DATE/TIME: 09/24/2022, 3:03 PM    INDICATION: Fall, tender to palpation.  COMPARISON: None.      Impression    IMPRESSION: Degenerative changes in the visualized lumbar spine and in the SI joints. Diffuse bone demineralization. No evidence of fracture.     CT Head w/o Contrast    Narrative    EXAM: CT HEAD W/O CONTRAST  LOCATION: [facility]  DATE/TIME: 9/25/2022 12:12 PM    INDICATION: Head trauma, pain  COMPARISON: Head CT 09/24/2022.  TECHNIQUE: Routine CT Head without IV contrast. Multiplanar reformats. Dose reduction techniques were used.    FINDINGS:  INTRACRANIAL CONTENTS: No intracranial hemorrhage, extraaxial collection, or mass effect.  No CT evidence of acute  infarct. Mild to moderate presumed chronic small vessel ischemic changes throughout the cerebral hemispheric white matter. 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 Mild to moderate generalized volume loss. No hydrocephalus.     VISUALIZED ORBITS/SINUSES/MASTOIDS: No intraorbital abnormality. No paranasal sinus mucosal disease. No middle ear or mastoid effusion.    BONES/SOFT TISSUES: No acute abnormality.      Impression    IMPRESSION:  1.  No acute intracranial process. No significant change since 09/24/2022.  2.  Stable mild to moderate chronic small vessel ischemic disease and generalized brain parenchymal volume loss.

## 2022-09-25 NOTE — UTILIZATION REVIEW
Concurrent stay review; Secondary Review Determination     Under the authority of the Utilization Management Committee, the utilization review process indicated a secondary review on Marycarmen Warren.  The review outcome is based on review of the medical records, discussions with staff, and applying clinical experience noted on the date of the review.        (x) Observation Status Appropriate - Concurrent stay review    RATIONALE FOR DETERMINATION   87-year-old female admitted after mechanical fall, found to have T2 compression fracture, age undetermined.  Seen by neurosurgery, no brace indicated.  Not receiving IV pain medication.  Had CK of 732 on admission but not given IV fluids, CK not rechecked.  Also has RJ versus chronic kidney disease with admitting creatinine of 1.47.  Trending daily.  Awaiting TCU placement.    Patient is clinically improving and there is no clear indication to change patient's status to inpatient. The severity of illness, intensity of service provided, expected LOS and risk for adverse outcome make the care appropriate for observation.    The information on this document is developed by the utilization review team in order for the business office to ensure compliance.  This only denotes the appropriateness of proper admission status and does not reflect the quality of care rendered.         The definitions of Inpatient Status and Observation Status used in making the determination above are those provided in the CMS Coverage Manual, Chapter 1 and Chapter 6, section 70.4.      Sincerely,   Haris Alonzo MD  Utilization Review  Physician Advisor  [facility]

## 2022-09-26 ENCOUNTER — APPOINTMENT (OUTPATIENT)
Dept: PHYSICAL THERAPY | Facility: HOSPITAL | Age: 87
End: 2022-09-26
Payer: MEDICARE

## 2022-09-26 ENCOUNTER — APPOINTMENT (OUTPATIENT)
Dept: OCCUPATIONAL THERAPY | Facility: HOSPITAL | Age: 87
End: 2022-09-26
Payer: MEDICARE

## 2022-09-26 LAB
ANION GAP SERPL CALCULATED.3IONS-SCNC: 6 MMOL/L (ref 7–15)
BUN SERPL-MCNC: 40.9 MG/DL (ref 8–23)
CALCIUM SERPL-MCNC: 8.3 MG/DL (ref 8.8–10.2)
CHLORIDE SERPL-SCNC: 106 MMOL/L (ref 98–107)
CREAT SERPL-MCNC: 1.22 MG/DL (ref 0.51–0.95)
DEPRECATED HCO3 PLAS-SCNC: 24 MMOL/L (ref 22–29)
ERYTHROCYTE [DISTWIDTH] IN BLOOD BY AUTOMATED COUNT: 14.2 % (ref 10–15)
GFR SERPL CREATININE-BSD FRML MDRD: 43 ML/MIN/1.73M2
GLUCOSE SERPL-MCNC: 97 MG/DL (ref 70–99)
HCT VFR BLD AUTO: 33 % (ref 35–47)
HGB BLD-MCNC: 10.8 G/DL (ref 11.7–15.7)
MCH RBC QN AUTO: 29.4 PG (ref 26.5–33)
MCHC RBC AUTO-ENTMCNC: 32.7 G/DL (ref 31.5–36.5)
MCV RBC AUTO: 90 FL (ref 78–100)
PLATELET # BLD AUTO: 191 10E3/UL (ref 150–450)
POTASSIUM SERPL-SCNC: 3.9 MMOL/L (ref 3.4–5.3)
RBC # BLD AUTO: 3.67 10E6/UL (ref 3.8–5.2)
SODIUM SERPL-SCNC: 136 MMOL/L (ref 136–145)
WBC # BLD AUTO: 6.9 10E3/UL (ref 4–11)

## 2022-09-26 PROCEDURE — G0378 HOSPITAL OBSERVATION PER HR: HCPCS

## 2022-09-26 PROCEDURE — 97535 SELF CARE MNGMENT TRAINING: CPT | Mod: GO

## 2022-09-26 PROCEDURE — 250N000013 HC RX MED GY IP 250 OP 250 PS 637: Performed by: STUDENT IN AN ORGANIZED HEALTH CARE EDUCATION/TRAINING PROGRAM

## 2022-09-26 PROCEDURE — 85014 HEMATOCRIT: CPT | Performed by: STUDENT IN AN ORGANIZED HEALTH CARE EDUCATION/TRAINING PROGRAM

## 2022-09-26 PROCEDURE — 99225 PR SUBSEQUENT OBSERVATION CARE,LEVEL II: CPT | Mod: GC | Performed by: STUDENT IN AN ORGANIZED HEALTH CARE EDUCATION/TRAINING PROGRAM

## 2022-09-26 PROCEDURE — 80048 BASIC METABOLIC PNL TOTAL CA: CPT | Performed by: STUDENT IN AN ORGANIZED HEALTH CARE EDUCATION/TRAINING PROGRAM

## 2022-09-26 PROCEDURE — 36415 COLL VENOUS BLD VENIPUNCTURE: CPT | Performed by: STUDENT IN AN ORGANIZED HEALTH CARE EDUCATION/TRAINING PROGRAM

## 2022-09-26 PROCEDURE — 97116 GAIT TRAINING THERAPY: CPT | Mod: GP

## 2022-09-26 PROCEDURE — 97530 THERAPEUTIC ACTIVITIES: CPT | Mod: GP

## 2022-09-26 RX ADMIN — PANTOPRAZOLE SODIUM 40 MG: 40 TABLET, DELAYED RELEASE ORAL at 07:47

## 2022-09-26 RX ADMIN — APIXABAN 2.5 MG: 2.5 TABLET, FILM COATED ORAL at 20:08

## 2022-09-26 RX ADMIN — OXYCODONE HYDROCHLORIDE 5 MG: 5 TABLET ORAL at 11:32

## 2022-09-26 RX ADMIN — ACETAMINOPHEN 975 MG: 325 TABLET, FILM COATED ORAL at 14:07

## 2022-09-26 RX ADMIN — APIXABAN 2.5 MG: 2.5 TABLET, FILM COATED ORAL at 07:46

## 2022-09-26 RX ADMIN — ACETAMINOPHEN 975 MG: 325 TABLET, FILM COATED ORAL at 06:14

## 2022-09-26 RX ADMIN — LEVOTHYROXINE SODIUM 88 MCG: 88 TABLET ORAL at 07:46

## 2022-09-26 RX ADMIN — ACETAMINOPHEN 975 MG: 325 TABLET, FILM COATED ORAL at 22:35

## 2022-09-26 RX ADMIN — LIDOCAINE PATCH 4% 1 PATCH: 40 PATCH TOPICAL at 07:47

## 2022-09-26 ASSESSMENT — ACTIVITIES OF DAILY LIVING (ADL)
ADLS_ACUITY_SCORE: 35

## 2022-09-26 NOTE — PROGRESS NOTES
Care Management Follow Up    Length of Stay (days): 0    Expected Discharge Date: 09/26/2022     Concerns to be Addressed:    Discharge Planning   Patient plan of care discussed at interdisciplinary rounds: Yes    Anticipated Discharge Disposition:  TCU     Anticipated Discharge Services:    Anticipated Discharge DME:      Patient/family educated on Medicare website which has current facility and service quality ratings:  Yes  Education Provided on the Discharge Plan:  Yes  Patient/Family in Agreement with the Plan:  Yes    Referrals Placed by CM/SW:  TCU referrals sent  Private pay costs discussed: Not applicable    Additional Information:  SW met with patient in room to talk about discharge planning. Therapy is recommending TCU. Patient would like to go to [facility] TCU as she lives in their [facility]. SW spoke to [facility] to provide update on patient.     SW spoke to patient's daughter who would like referrals sent to [facility], [facility], and [facility]. Referrals sent. Daughter, Carolyn ([phone]) requests to be main contact as she states patient can be forgetful.    3:13 PM  [facility] do not have beds open for the rest of the week.       Wanda Pan

## 2022-09-26 NOTE — PROGRESS NOTES
PRIMARY DIAGNOSIS: GENERALIZED WEAKNESS    OUTPATIENT/OBSERVATION GOALS TO BE MET BEFORE DISCHARGE  1. Orthostatic performed: No    2. Tolerating PO medications: Yes    3. Return to near baseline physical activity: No. Ax1. Transferred to chair by therapy.    4. Cleared for discharge by consultants (if involved): Yes    Discharge Planner Nurse   Safe discharge environment identified: Yes. TCU  Barriers to discharge: Placement       Entered by: Alicia Pinon RN 09/26/2022 3:57 PM     Please review provider order for any additional goals.   Nurse to notify provider when observation goals have been met and patient is ready for discharge.

## 2022-09-26 NOTE — PROGRESS NOTES
[facility]    Progress Note - Hospitalist Service       Date of Admission:  9/24/2022    Assessment & Plan                 Marycarmen Warren is a 87 year old female admitted on 9/24/2022. She has a history of A. fib on anticoagulation and hypothyroidism and is admitted after a mechanical fall. Medically cleared for placement to TCU today, 9/26/2022.    Mechanical fall  T2 compression fracture, age undetermined  Patient with a fall the day prior to admission, and a fall on the day of admission.   CK of 732 on admission.  X-rays of left shoulder, right knee and right ankle, pelvis without fracture.  CT head and spine show T2 compression fracture with age indeterminate. No other abnormalities, CT head stable on repeat today.  -PT/OTs  -Schedule Tylenol, as needed oxycodone 2.5 to 5 mg available, lidocaine patches  -Neurosurgery consulted due to compression fracture, appreciate recommendations   -No brace indicated  -Fall precautions     Mild cognitive impairment  SLUMS 18  Daughter is concerned about some memory problems, currently living at [facility], may need higher level of care at discharge. After falls forgot multiple times to press life alert button.  -Plan for discharge to TCU  -PT/OT daily     RJ versus CKD  Elevated CK  Patient with creatinine of 1.47, unable to see previous records, so uncertain what her baseline creatinine function is. Stable today. CK elevated to 732, could be contributing to RJ as well.  If RJ, likely secondary to lack of intake due to fall.  -BMP in a.m.    Chronic medical conditions  Hypothyroidism: PTA levothyroxine  GERD: PTA Protonix  A. Fib: PTA Xarelto      Diet: Combination Diet Regular Diet Adult    DVT Prophylaxis: DOAC  Gonzales Catheter: Not present  Fluids: none  Central Lines: None  Cardiac Monitoring: None  Code Status: No CPR- Do NOT Intubate      Disposition Plan      Expected Discharge Date: 09/26/2022    Discharge Delays: Placement - TCU           "  The patient's care was discussed with the Attending Physician, Dr. Campa.    Negar Regalado MD  Hospitalist Service  [facility]  Securely message with the [employer] Web Console (learn more here)  Text page via [employer] Paging/Directory         Clinically Significant Risk Factors Present on Admission               # Coagulation Defect: home medication list includes an anticoagulant medication      # Overweight: Estimated body mass index is 26.05 kg/m  as calculated from the following:    Height as of this encounter: 1.676 m (5' 6\").    Weight as of this encounter: 73.2 kg (161 lb 6 oz).        ______________________________________________________________________    Interval History   No acute events overnight.  Marycarmen feeling well this morning, resting comfortably in bed. No other concerns today.    Data reviewed today: I reviewed all medications, new labs and imaging results over the last 24 hours. I personally reviewed the CT head, Xray image(s) showing no acute changes.    Physical Exam   Vital Signs: Temp: 98.6  F (37  C) Temp src: Oral BP: 137/63 Pulse: 90   Resp: 16 SpO2: 96 % O2 Device: None (Room air)    Weight: 161 lbs 6 oz    Constitutional: awake, alert, cooperative, no apparent distress, and appears stated age  Eyes: Lids and lashes normal, pupils equal, round and reactive to light, extra ocular muscles intact, sclera clear, conjunctiva normal  Respiratory: No increased work of breathing, good air exchange, clear to auscultation bilaterally, no crackles or wheezing  Cardiovascular: Normal apical impulse, regular rate and rhythm, normal S1 and S2, no S3 or S4, and no murmur noted  Skin: no bruising or bleeding  Musculoskeletal: +1 pitting edema on shins bilaterally, tender to touch.    No erythema or swelling noted.  Neurologic: Awake, alert, oriented to name, place and time.   Neuropsychiatric: General: normal, calm and normal eye contact    Data    Recent Results (from the past " 24 hour(s))   Basic metabolic panel    Collection Time: 09/26/22  6:57 AM   Result Value Ref Range    Sodium 136 136 - 145 mmol/L    Potassium 3.9 3.4 - 5.3 mmol/L    Chloride 106 98 - 107 mmol/L    Carbon Dioxide (CO2) 24 22 - 29 mmol/L    Anion Gap 6 (L) 7 - 15 mmol/L    Urea Nitrogen 40.9 (H) 8.0 - 23.0 mg/dL    Creatinine 1.22 (H) 0.51 - 0.95 mg/dL    Calcium 8.3 (L) 8.8 - 10.2 mg/dL    Glucose 97 70 - 99 mg/dL    GFR Estimate 43 (L) >60 mL/min/1.73m2   CBC with platelets    Collection Time: 09/26/22  6:57 AM   Result Value Ref Range    WBC Count 6.9 4.0 - 11.0 10e3/uL    RBC Count 3.67 (L) 3.80 - 5.20 10e6/uL    Hemoglobin 10.8 (L) 11.7 - 15.7 g/dL    Hematocrit 33.0 (L) 35.0 - 47.0 %    MCV 90 78 - 100 fL    MCH 29.4 26.5 - 33.0 pg    MCHC 32.7 31.5 - 36.5 g/dL    RDW 14.2 10.0 - 15.0 %    Platelet Count 191 150 - 450 10e3/uL       Recent Results (from the past 24 hour(s))   CT Head w/o Contrast    Narrative    EXAM: CT HEAD W/O CONTRAST  LOCATION: [facility]  DATE/TIME: 9/25/2022 12:12 PM    INDICATION: Head trauma, pain  COMPARISON: Head CT 09/24/2022.  TECHNIQUE: Routine CT Head without IV contrast. Multiplanar reformats. Dose reduction techniques were used.    FINDINGS:  INTRACRANIAL CONTENTS: No intracranial hemorrhage, extraaxial collection, or mass effect.  No CT evidence of acute infarct. Mild to moderate presumed chronic small vessel ischemic changes throughout the cerebral hemispheric white matter. 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 Mild to moderate generalized volume loss. No hydrocephalus.     VISUALIZED ORBITS/SINUSES/MASTOIDS: No intraorbital abnormality. No paranasal sinus mucosal disease. No middle ear or mastoid effusion.    BONES/SOFT TISSUES: No acute abnormality.      Impression    IMPRESSION:  1.  No acute intracranial process. No significant change since 09/24/2022.  2.  Stable mild to moderate chronic small vessel  ischemic disease and generalized brain parenchymal volume loss.

## 2022-09-26 NOTE — PROGRESS NOTES
PRIMARY DIAGNOSIS: GENERALIZED WEAKNESS    OUTPATIENT/OBSERVATION GOALS TO BE MET BEFORE DISCHARGE  1. Orthostatic performed: No    2. Tolerating PO medications: Yes    3. Return to near baseline physical activity: No. Ax2. Complains of pain when legs touched.    4. Cleared for discharge by consultants (if involved): Yes    Discharge Planner Nurse   Safe discharge environment identified: Yes. TCU  Barriers to discharge: Placement       Entered by: Alicia Pinon RN 09/26/2022 10:05 AM     Please review provider order for any additional goals.   Nurse to notify provider when observation goals have been met and patient is ready for discharge.

## 2022-09-26 NOTE — PLAN OF CARE
Problem: Plan of Care - These are the overarching goals to be used throughout the patient stay.    Goal: Optimal Comfort and Wellbeing  Outcome: Ongoing, Progressing   Goal Outcome Evaluation:    Patient alert and oriented x 3.Disoriented to time.VSS.Pain managed with Tylenol.Transfers with assist of 2 using a gait belt and a walker.Pure wick in place for incontinence.Bed alarm on for safety.Call light within reach.

## 2022-09-26 NOTE — PROGRESS NOTES
"PRIMARY DIAGNOSIS: \"GENERIC\" NURSING  OUTPATIENT/OBSERVATION GOALS TO BE MET BEFORE DISCHARGE:  1. ADLs back to baseline: No    2. Activity and level of assistance: Up with maximum assistance. Consider SW and/or PT evaluation.     3. Pain status: Improved-controlled with oral pain medications.    4. Return to near baseline physical activity: No     Discharge Planner Nurse   Safe discharge environment identified: No  Barriers to discharge: Yes       Entered by: Koby Reid RN 09/26/2022 7:04 AM     Please review provider order for any additional goals.   Nurse to notify provider when observation goals have been met and patient is ready for discharge.  "
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
## 2022-09-26 NOTE — PROGRESS NOTES
"PRIMARY DIAGNOSIS: \"GENERIC\" NURSING  OUTPATIENT/OBSERVATION GOALS TO BE MET BEFORE DISCHARGE:  1. ADLs back to baseline: No    2. Activity and level of assistance: Up with standby assistance.    3. Pain status: Improved-controlled with oral pain medications.    4. Return to near baseline physical activity: No     Discharge Planner Nurse   Safe discharge environment identified: No  Barriers to discharge: Yes       Entered by: Koby Reid RN 09/26/2022 7:05 AM     Please review provider order for any additional goals.   Nurse to notify provider when observation goals have been met and patient is ready for discharge.  "

## 2022-09-27 ENCOUNTER — APPOINTMENT (OUTPATIENT)
Dept: OCCUPATIONAL THERAPY | Facility: HOSPITAL | Age: 87
End: 2022-09-27
Payer: MEDICARE

## 2022-09-27 ENCOUNTER — APPOINTMENT (OUTPATIENT)
Dept: PHYSICAL THERAPY | Facility: HOSPITAL | Age: 87
End: 2022-09-27
Payer: MEDICARE

## 2022-09-27 PROBLEM — G30.1 LATE ONSET ALZHEIMER'S DEMENTIA WITHOUT BEHAVIORAL DISTURBANCE (H): Status: ACTIVE | Noted: 2022-09-27

## 2022-09-27 PROBLEM — F02.80 LATE ONSET ALZHEIMER'S DEMENTIA WITHOUT BEHAVIORAL DISTURBANCE (H): Status: ACTIVE | Noted: 2022-09-27

## 2022-09-27 LAB
ANION GAP SERPL CALCULATED.3IONS-SCNC: 7 MMOL/L (ref 7–15)
ATRIAL RATE - MUSE: 535 BPM
BUN SERPL-MCNC: 33.1 MG/DL (ref 8–23)
CALCIUM SERPL-MCNC: 8.6 MG/DL (ref 8.8–10.2)
CHLORIDE SERPL-SCNC: 110 MMOL/L (ref 98–107)
CREAT SERPL-MCNC: 1.04 MG/DL (ref 0.51–0.95)
DEPRECATED HCO3 PLAS-SCNC: 24 MMOL/L (ref 22–29)
DIASTOLIC BLOOD PRESSURE - MUSE: NORMAL MMHG
ERYTHROCYTE [DISTWIDTH] IN BLOOD BY AUTOMATED COUNT: 14.3 % (ref 10–15)
GFR SERPL CREATININE-BSD FRML MDRD: 52 ML/MIN/1.73M2
GLUCOSE SERPL-MCNC: 97 MG/DL (ref 70–99)
HCT VFR BLD AUTO: 35.4 % (ref 35–47)
HGB BLD-MCNC: 11.5 G/DL (ref 11.7–15.7)
INTERPRETATION ECG - MUSE: NORMAL
MCH RBC QN AUTO: 29.4 PG (ref 26.5–33)
MCHC RBC AUTO-ENTMCNC: 32.5 G/DL (ref 31.5–36.5)
MCV RBC AUTO: 91 FL (ref 78–100)
P AXIS - MUSE: NORMAL DEGREES
PLATELET # BLD AUTO: 214 10E3/UL (ref 150–450)
POTASSIUM SERPL-SCNC: 4.3 MMOL/L (ref 3.4–5.3)
PR INTERVAL - MUSE: NORMAL MS
QRS DURATION - MUSE: 86 MS
QT - MUSE: 390 MS
QTC - MUSE: 449 MS
R AXIS - MUSE: 11 DEGREES
RBC # BLD AUTO: 3.91 10E6/UL (ref 3.8–5.2)
SODIUM SERPL-SCNC: 141 MMOL/L (ref 136–145)
SYSTOLIC BLOOD PRESSURE - MUSE: NORMAL MMHG
T AXIS - MUSE: 74 DEGREES
VENTRICULAR RATE- MUSE: 80 BPM
WBC # BLD AUTO: 8 10E3/UL (ref 4–11)

## 2022-09-27 PROCEDURE — 82310 ASSAY OF CALCIUM: CPT | Performed by: STUDENT IN AN ORGANIZED HEALTH CARE EDUCATION/TRAINING PROGRAM

## 2022-09-27 PROCEDURE — 250N000013 HC RX MED GY IP 250 OP 250 PS 637: Performed by: STUDENT IN AN ORGANIZED HEALTH CARE EDUCATION/TRAINING PROGRAM

## 2022-09-27 PROCEDURE — 85027 COMPLETE CBC AUTOMATED: CPT | Performed by: STUDENT IN AN ORGANIZED HEALTH CARE EDUCATION/TRAINING PROGRAM

## 2022-09-27 PROCEDURE — 97535 SELF CARE MNGMENT TRAINING: CPT | Mod: GO

## 2022-09-27 PROCEDURE — 36415 COLL VENOUS BLD VENIPUNCTURE: CPT | Performed by: STUDENT IN AN ORGANIZED HEALTH CARE EDUCATION/TRAINING PROGRAM

## 2022-09-27 PROCEDURE — 97530 THERAPEUTIC ACTIVITIES: CPT | Mod: GP

## 2022-09-27 PROCEDURE — G0378 HOSPITAL OBSERVATION PER HR: HCPCS

## 2022-09-27 PROCEDURE — 99224 PR SUBSEQUENT OBSERVATION CARE,LEVEL I: CPT | Mod: GC | Performed by: STUDENT IN AN ORGANIZED HEALTH CARE EDUCATION/TRAINING PROGRAM

## 2022-09-27 RX ORDER — NALOXONE HYDROCHLORIDE 0.4 MG/ML
0.2 INJECTION, SOLUTION INTRAMUSCULAR; INTRAVENOUS; SUBCUTANEOUS
Status: DISCONTINUED | OUTPATIENT
Start: 2022-09-27 | End: 2022-09-29 | Stop reason: HOSPADM

## 2022-09-27 RX ORDER — NALOXONE HYDROCHLORIDE 0.4 MG/ML
0.4 INJECTION, SOLUTION INTRAMUSCULAR; INTRAVENOUS; SUBCUTANEOUS
Status: DISCONTINUED | OUTPATIENT
Start: 2022-09-27 | End: 2022-09-29 | Stop reason: HOSPADM

## 2022-09-27 RX ADMIN — OXYCODONE HYDROCHLORIDE 5 MG: 5 TABLET ORAL at 07:58

## 2022-09-27 RX ADMIN — LEVOTHYROXINE SODIUM 88 MCG: 88 TABLET ORAL at 07:45

## 2022-09-27 RX ADMIN — APIXABAN 2.5 MG: 2.5 TABLET, FILM COATED ORAL at 07:45

## 2022-09-27 RX ADMIN — ACETAMINOPHEN 975 MG: 325 TABLET, FILM COATED ORAL at 21:21

## 2022-09-27 RX ADMIN — ACETAMINOPHEN 975 MG: 325 TABLET, FILM COATED ORAL at 13:43

## 2022-09-27 RX ADMIN — APIXABAN 2.5 MG: 2.5 TABLET, FILM COATED ORAL at 20:20

## 2022-09-27 RX ADMIN — PANTOPRAZOLE SODIUM 40 MG: 40 TABLET, DELAYED RELEASE ORAL at 07:44

## 2022-09-27 RX ADMIN — LIDOCAINE PATCH 4% 1 PATCH: 40 PATCH TOPICAL at 07:45

## 2022-09-27 RX ADMIN — ACETAMINOPHEN 975 MG: 325 TABLET, FILM COATED ORAL at 06:46

## 2022-09-27 ASSESSMENT — ACTIVITIES OF DAILY LIVING (ADL)
ADLS_ACUITY_SCORE: 40
ADLS_ACUITY_SCORE: 35
ADLS_ACUITY_SCORE: 40
ADLS_ACUITY_SCORE: 35
ADLS_ACUITY_SCORE: 40
ADLS_ACUITY_SCORE: 35
ADLS_ACUITY_SCORE: 40
ADLS_ACUITY_SCORE: 35
ADLS_ACUITY_SCORE: 35
ADLS_ACUITY_SCORE: 40

## 2022-09-27 NOTE — PLAN OF CARE
PRIMARY DIAGNOSIS: GENERALIZED WEAKNESS    OUTPATIENT/OBSERVATION GOALS TO BE MET BEFORE DISCHARGE  1. Orthostatic performed: N/A    2. Tolerating PO medications: Yes    3. Return to near baseline physical activity: No    4. Cleared for discharge by consultants (if involved): Yes    Discharge Planner Nurse   Safe discharge environment identified: Yes  Barriers to discharge: Yes       Entered by: Salima Guzman RN 09/27/2022 7:04 AM     Please review provider order for any additional goals.   Nurse to notify provider when observation goals have been met and patient is ready for discharge.

## 2022-09-27 NOTE — PROGRESS NOTES
[facility]    Progress Note - Hospitalist Service       Date of Admission:  9/24/2022    Assessment & Plan           Marycarmen Warren is a 87 year old female admitted on 9/24/2022. She has a history of A. fib on anticoagulation and hypothyroidism and is admitted after a mechanical fall. Medically cleared for placement to TCU on 9/26/2022.    Mechanical fall  T2 compression fracture, age undetermined  Patient with a fall the day prior to admission, and a fall on the day of admission.   CK of 732 on admission.  X-rays of left shoulder, right knee and right ankle, pelvis without fracture.  CT head and spine show T2 compression fracture with age indeterminate. No other abnormalities, CT head stable on repeat today.  -PT/OTs  -Schedule Tylenol, as needed oxycodone 2.5 to 5 mg available, lidocaine patches  -Neurosurgery consulted due to compression fracture, appreciate recommendations   -No brace indicated  -Fall precautions     Dementia  SLUMS 18  Daughter is concerned about some memory problems, currently living at [facility], may need higher level of care at discharge. After falls forgot multiple times to press life alert button.  -Plan for discharge to TCU  -PT/OT daily     RJ, improving  Elevated CK  Patient with creatinine of 1.47, unable to see previous records, so uncertain what her baseline creatinine function is. CK elevated to 732, could be contributing to RJ as well.  RJ, likely secondary to lack of intake due to fall.  -BMP in a.m.    Chronic medical conditions  Hypothyroidism: PTA levothyroxine  GERD: PTA Protonix  A. Fib: PTA Xarelto      Diet: Combination Diet Regular Diet Adult    DVT Prophylaxis: DOAC  Gonzales Catheter: Not present  Fluids: none  Central Lines: None  Cardiac Monitoring: None  Code Status: No CPR- Do NOT Intubate      Disposition Plan      Expected Discharge Date: 09/27/2022    Discharge Delays: Placement - TCU            The patient's care was discussed with the  "Attending Physician, Dr. Campa.    Negar Regalado MD  Hospitalist Service  [facility]  Securely message with the [email or web address] Web Console (learn more here)  Text page via [employer] Paging/Directory       Clinically Significant Risk Factors Present on Admission               # Coagulation Defect: home medication list includes an anticoagulant medication      # Overweight: Estimated body mass index is 26.05 kg/m  as calculated from the following:    Height as of this encounter: 1.676 m (5' 6\").    Weight as of this encounter: 73.2 kg (161 lb 6 oz).        ______________________________________________________________________    Interval History   No acute events overnight. Marycarmen is up in the chair eating breakfast. No concerns.    Data reviewed today: I reviewed all medications, new labs and imaging results over the last 24 hours. I personally reviewed the CT head, Xray image(s) showing no acute changes.    Physical Exam   Vital Signs: Temp: 98.1  F (36.7  C) Temp src: Oral BP: (!) 153/70 Pulse: 75   Resp: 16 SpO2: 97 % O2 Device: None (Room air)    Weight: 161 lbs 6 oz    Constitutional: awake, alert, cooperative, no apparent distress, and appears stated age  Eyes: Lids and lashes normal, pupils equal, round and reactive to light, extra ocular muscles intact, sclera clear, conjunctiva normal  Respiratory: No increased work of breathing, good air exchange, clear to auscultation bilaterally, no crackles or wheezing  Cardiovascular: Normal apical impulse, regular rate and rhythm, normal S1 and S2, no S3 or S4, and no murmur noted  Skin: no bruising or bleeding  Musculoskeletal: +1 pitting edema on shins bilaterally, tender to touch--more on the R than L today.    No erythema or swelling noted.  Neurologic: Awake, alert, oriented to name, place and time.   Neuropsychiatric: General: normal, calm and normal eye contact    Data    Recent Results (from the past 24 hour(s))   Basic metabolic panel    " Collection Time: 09/27/22  6:08 AM   Result Value Ref Range    Sodium 141 136 - 145 mmol/L    Potassium 4.3 3.4 - 5.3 mmol/L    Chloride 110 (H) 98 - 107 mmol/L    Carbon Dioxide (CO2) 24 22 - 29 mmol/L    Anion Gap 7 7 - 15 mmol/L    Urea Nitrogen 33.1 (H) 8.0 - 23.0 mg/dL    Creatinine 1.04 (H) 0.51 - 0.95 mg/dL    Calcium 8.6 (L) 8.8 - 10.2 mg/dL    Glucose 97 70 - 99 mg/dL    GFR Estimate 52 (L) >60 mL/min/1.73m2   CBC with platelets    Collection Time: 09/27/22  6:08 AM   Result Value Ref Range    WBC Count 8.0 4.0 - 11.0 10e3/uL    RBC Count 3.91 3.80 - 5.20 10e6/uL    Hemoglobin 11.5 (L) 11.7 - 15.7 g/dL    Hematocrit 35.4 35.0 - 47.0 %    MCV 91 78 - 100 fL    MCH 29.4 26.5 - 33.0 pg    MCHC 32.5 31.5 - 36.5 g/dL    RDW 14.3 10.0 - 15.0 %    Platelet Count 214 150 - 450 10e3/uL       No results found for this or any previous visit (from the past 24 hour(s)).

## 2022-09-27 NOTE — PROGRESS NOTES
I spoke with Mark at [facility] () and they are screening patient for TCU in their LT memory care unit. They will need to get BC authorization. I updated daughter,aCrolyn. Discharge is anticipated for 9/28. PAS and medical transport will be needed.

## 2022-09-27 NOTE — PLAN OF CARE
PRIMARY DIAGNOSIS: GENERALIZED WEAKNESS    OUTPATIENT/OBSERVATION GOALS TO BE MET BEFORE DISCHARGE  1. Orthostatic performed: N/A    2. Tolerating PO medications: Yes    3. Return to near baseline physical activity: No    4. Cleared for discharge by consultants (if involved): Yes    Discharge Planner Nurse   Safe discharge environment identified: Yes  Barriers to discharge: Yes       Entered by: Salima Guzman RN 09/27/2022 7:04 AM    Pt was confused and reoriented to time and place. Pt complained of numbness in left middle and rings finger. Pt slept through the night and denies pain this shift. Pt is waiting to hear back if she can be discharged to TCU.    Salima Guzman RN      Please review provider order for any additional goals.   Nurse to notify provider when observation goals have been met and patient is ready for discharge.

## 2022-09-28 ENCOUNTER — APPOINTMENT (OUTPATIENT)
Dept: PHYSICAL THERAPY | Facility: HOSPITAL | Age: 87
End: 2022-09-28
Payer: MEDICARE

## 2022-09-28 LAB
ANION GAP SERPL CALCULATED.3IONS-SCNC: 8 MMOL/L (ref 7–15)
BUN SERPL-MCNC: 30.4 MG/DL (ref 8–23)
CALCIUM SERPL-MCNC: 8.8 MG/DL (ref 8.8–10.2)
CHLORIDE SERPL-SCNC: 108 MMOL/L (ref 98–107)
CREAT SERPL-MCNC: 1.07 MG/DL (ref 0.51–0.95)
DEPRECATED HCO3 PLAS-SCNC: 26 MMOL/L (ref 22–29)
ERYTHROCYTE [DISTWIDTH] IN BLOOD BY AUTOMATED COUNT: 14.1 % (ref 10–15)
GFR SERPL CREATININE-BSD FRML MDRD: 50 ML/MIN/1.73M2
GLUCOSE SERPL-MCNC: 102 MG/DL (ref 70–99)
HCT VFR BLD AUTO: 35.6 % (ref 35–47)
HGB BLD-MCNC: 11.7 G/DL (ref 11.7–15.7)
MCH RBC QN AUTO: 29.5 PG (ref 26.5–33)
MCHC RBC AUTO-ENTMCNC: 32.9 G/DL (ref 31.5–36.5)
MCV RBC AUTO: 90 FL (ref 78–100)
PLATELET # BLD AUTO: 248 10E3/UL (ref 150–450)
POTASSIUM SERPL-SCNC: 4.4 MMOL/L (ref 3.4–5.3)
RBC # BLD AUTO: 3.96 10E6/UL (ref 3.8–5.2)
SODIUM SERPL-SCNC: 142 MMOL/L (ref 136–145)
WBC # BLD AUTO: 7.6 10E3/UL (ref 4–11)

## 2022-09-28 PROCEDURE — 97110 THERAPEUTIC EXERCISES: CPT | Mod: GO

## 2022-09-28 PROCEDURE — 80048 BASIC METABOLIC PNL TOTAL CA: CPT | Performed by: STUDENT IN AN ORGANIZED HEALTH CARE EDUCATION/TRAINING PROGRAM

## 2022-09-28 PROCEDURE — 97530 THERAPEUTIC ACTIVITIES: CPT | Mod: GP

## 2022-09-28 PROCEDURE — 85027 COMPLETE CBC AUTOMATED: CPT | Performed by: STUDENT IN AN ORGANIZED HEALTH CARE EDUCATION/TRAINING PROGRAM

## 2022-09-28 PROCEDURE — 97116 GAIT TRAINING THERAPY: CPT | Mod: GP

## 2022-09-28 PROCEDURE — 99225 PR SUBSEQUENT OBSERVATION CARE,LEVEL II: CPT | Mod: GC | Performed by: STUDENT IN AN ORGANIZED HEALTH CARE EDUCATION/TRAINING PROGRAM

## 2022-09-28 PROCEDURE — 250N000013 HC RX MED GY IP 250 OP 250 PS 637: Performed by: STUDENT IN AN ORGANIZED HEALTH CARE EDUCATION/TRAINING PROGRAM

## 2022-09-28 PROCEDURE — G0378 HOSPITAL OBSERVATION PER HR: HCPCS

## 2022-09-28 PROCEDURE — 97110 THERAPEUTIC EXERCISES: CPT | Mod: GP

## 2022-09-28 PROCEDURE — 36415 COLL VENOUS BLD VENIPUNCTURE: CPT | Performed by: STUDENT IN AN ORGANIZED HEALTH CARE EDUCATION/TRAINING PROGRAM

## 2022-09-28 PROCEDURE — 97535 SELF CARE MNGMENT TRAINING: CPT | Mod: GO

## 2022-09-28 RX ORDER — LIDOCAINE 4 G/G
1 PATCH TOPICAL EVERY 24 HOURS
Qty: 7 PATCH | Refills: 0 | Status: SHIPPED | OUTPATIENT
Start: 2022-09-28 | End: 2022-09-28

## 2022-09-28 RX ORDER — OXYCODONE HYDROCHLORIDE 5 MG/1
5 TABLET ORAL EVERY 6 HOURS PRN
Qty: 7 TABLET | Refills: 0 | Status: SHIPPED | OUTPATIENT
Start: 2022-09-28

## 2022-09-28 RX ORDER — OXYCODONE HYDROCHLORIDE 5 MG/1
5 TABLET ORAL EVERY 6 HOURS PRN
Qty: 7 TABLET | Refills: 0 | Status: SHIPPED | OUTPATIENT
Start: 2022-09-28 | End: 2022-09-28

## 2022-09-28 RX ORDER — LIDOCAINE 4 G/G
1 PATCH TOPICAL EVERY 24 HOURS
Qty: 7 PATCH | Refills: 0 | Status: SHIPPED | OUTPATIENT
Start: 2022-09-28

## 2022-09-28 RX ADMIN — ACETAMINOPHEN 975 MG: 325 TABLET, FILM COATED ORAL at 06:17

## 2022-09-28 RX ADMIN — OXYCODONE HYDROCHLORIDE 2.5 MG: 5 TABLET ORAL at 21:08

## 2022-09-28 RX ADMIN — LEVOTHYROXINE SODIUM 88 MCG: 88 TABLET ORAL at 08:21

## 2022-09-28 RX ADMIN — ACETAMINOPHEN 975 MG: 325 TABLET, FILM COATED ORAL at 21:08

## 2022-09-28 RX ADMIN — APIXABAN 2.5 MG: 2.5 TABLET, FILM COATED ORAL at 21:08

## 2022-09-28 RX ADMIN — ACETAMINOPHEN 975 MG: 325 TABLET, FILM COATED ORAL at 14:46

## 2022-09-28 RX ADMIN — PANTOPRAZOLE SODIUM 40 MG: 40 TABLET, DELAYED RELEASE ORAL at 08:21

## 2022-09-28 RX ADMIN — APIXABAN 2.5 MG: 2.5 TABLET, FILM COATED ORAL at 08:21

## 2022-09-28 RX ADMIN — LIDOCAINE PATCH 4% 1 PATCH: 40 PATCH TOPICAL at 08:21

## 2022-09-28 ASSESSMENT — ACTIVITIES OF DAILY LIVING (ADL)
ADLS_ACUITY_SCORE: 40
ADLS_ACUITY_SCORE: 42

## 2022-09-28 NOTE — PLAN OF CARE
Problem: Plan of Care - These are the overarching goals to be used throughout the patient stay.    Goal: Plan of Care Review/Shift Note  Description: The Plan of Care Review/Shift note should be completed every shift.  The Outcome Evaluation is a brief statement about your assessment that the patient is improving, declining, or no change.  This information will be displayed automatically on your shift note.  Outcome: Ongoing, Progressing  Goal: Absence of Hospital-Acquired Illness or Injury  Outcome: Ongoing, Progressing  Intervention: Identify and Manage Fall Risk  Recent Flowsheet Documentation  Taken 9/27/2022 1600 by Abisai Rae, RN  Safety Promotion/Fall Prevention:   activity supervised   assistive device/personal items within reach   bed alarm on   chair alarm on   clutter free environment maintained   fall prevention program maintained   nonskid shoes/slippers when out of bed   patient and family education   safety round/check completed    Intermittently confused to time and situation, reoriented to surroundings. Vitals stable. C/O mild hips pain and neck pain, scheduled Tylenol given. Assist -1 with walker to use bathroom. Butch Greenwood RN

## 2022-09-28 NOTE — PLAN OF CARE
PRIMARY DIAGNOSIS: GENERALIZED WEAKNESS    OUTPATIENT/OBSERVATION GOALS TO BE MET BEFORE DISCHARGE  1. Orthostatic performed: N/A    2. Tolerating PO medications: Yes    3. Return to near baseline physical activity: Yes    4. Cleared for discharge by consultants (if involved): No    Discharge Planner Nurse   Safe discharge environment identified: Yes  Barriers to discharge: Yes       Entered by: Wanda Reina RN 09/28/2022 6:00 AM    Please review provider order for any additional goals.   Nurse to notify provider when observation goals have been met and patient is ready for discharge.

## 2022-09-28 NOTE — PROGRESS NOTES
[facility]    Progress Note - Hospitalist Service       Date of Admission:  9/24/2022    Assessment & Plan           Marycarmen Warren is a 87 year old female admitted on 9/24/2022. She has a history of A. fib on anticoagulation and hypothyroidism and is admitted after a mechanical fall. Medically cleared for placement on 9/26/2022.    Plan for discharge to LTC tomorrow at 10 am    Mechanical fall  T2 compression fracture, age undetermined  Patient with a fall the day prior to admission, and a fall on the day of admission.    X-rays of left shoulder, right knee and right ankle, pelvis without fracture.  CT head and spine show T2 compression fracture with age indeterminate.  -PT/OTs  -Schedule Tylenol, as needed oxycodone 2.5 to 5 mg available, lidocaine patches  -Neurosurgery consulted due to compression fracture, appreciate recommendations   -No brace indicated  -Fall precautions     Dementia  SLUMS 18  -Plan for discharge to Long term care tomorrow  -PT/OT daily     RJ, improving  Elevated CK  Patient with creatinine of 1.47, unable to see previous records, so uncertain what her baseline creatinine function is. CK elevated to 732, could be contributing to RJ as well.  RJ, likely secondary to lack of intake due to fall.    Chronic medical conditions  Hypothyroidism: PTA levothyroxine  GERD: PTA Protonix  A. Fib: PTA Xarelto      Diet: Combination Diet Regular Diet Adult  Diet    DVT Prophylaxis: DOAC  Gonzales Catheter: Not present  Fluids: none  Central Lines: None  Cardiac Monitoring: None  Code Status: No CPR- Do NOT Intubate      Disposition Plan      Expected Discharge Date: 09/29/2022, 10:00 AM  Discharge Delays: Placement - TCU  *Medically Ready for Discharge    Discharge Comments: TCU bed available 9/29  Daughter to transport at 1000        The patient's care was discussed with the Attending Physician, Dr. Campa.    Negar Regalado MD  Hospitalist Service  [facility]  "[facility]  Securely message with the "[employer] Web Console (learn more here)  Text page via [employer] Paging/Directory       Clinically Significant Risk Factors Present on Admission               # Coagulation Defect: home medication list includes an anticoagulant medication      # Overweight: Estimated body mass index is 26.05 kg/m  as calculated from the following:    Height as of this encounter: 1.676 m (5' 6\").    Weight as of this encounter: 73.2 kg (161 lb 6 oz).        ______________________________________________________________________    Interval History   No acute events overnight. Marycarmen is up in the chair, no concerns today. Anxiously awaiting discharge, was hopeful for today but unfortunately will have to wait until tomorrow due to bed availability.    Data reviewed today: I reviewed all medications, new labs and imaging results over the last 24 hours. I personally reviewed the CT head, Xray image(s) showing no acute changes.    Physical Exam   Vital Signs: Temp: 97.8  F (36.6  C) Temp src: Oral BP: (!) 162/74 Pulse: 80   Resp: 16 SpO2: 95 % O2 Device: None (Room air)    Weight: 161 lbs 6 oz    Constitutional: awake, alert, cooperative, no apparent distress, and appears stated age  Eyes: Lids and lashes normal, pupils equal, round and reactive to light, extra ocular muscles intact, sclera clear, conjunctiva normal  Respiratory: No increased work of breathing, good air exchange, clear to auscultation bilaterally, no crackles or wheezing  Cardiovascular: Normal apical impulse, regular rate and rhythm, normal S1 and S2, no S3 or S4, and no murmur noted  Skin: no bruising or bleeding  Musculoskeletal: +1 pitting edema on shins bilaterally, tender to touch--more on the R than L today.    No erythema or swelling noted.  Neurologic: Awake, alert, oriented to name, place and time.   Neuropsychiatric: General: normal, calm and normal eye contact    Data    Recent Results (from the past 24 hour(s))   Basic " metabolic panel    Collection Time: 09/28/22  6:11 AM   Result Value Ref Range    Sodium 142 136 - 145 mmol/L    Potassium 4.4 3.4 - 5.3 mmol/L    Chloride 108 (H) 98 - 107 mmol/L    Carbon Dioxide (CO2) 26 22 - 29 mmol/L    Anion Gap 8 7 - 15 mmol/L    Urea Nitrogen 30.4 (H) 8.0 - 23.0 mg/dL    Creatinine 1.07 (H) 0.51 - 0.95 mg/dL    Calcium 8.8 8.8 - 10.2 mg/dL    Glucose 102 (H) 70 - 99 mg/dL    GFR Estimate 50 (L) >60 mL/min/1.73m2   CBC with platelets    Collection Time: 09/28/22  6:11 AM   Result Value Ref Range    WBC Count 7.6 4.0 - 11.0 10e3/uL    RBC Count 3.96 3.80 - 5.20 10e6/uL    Hemoglobin 11.7 11.7 - 15.7 g/dL    Hematocrit 35.6 35.0 - 47.0 %    MCV 90 78 - 100 fL    MCH 29.5 26.5 - 33.0 pg    MCHC 32.9 31.5 - 36.5 g/dL    RDW 14.1 10.0 - 15.0 %    Platelet Count 248 150 - 450 10e3/uL       No results found for this or any previous visit (from the past 24 hour(s)).

## 2022-09-28 NOTE — PROGRESS NOTES
Care Management Follow Up    Length of Stay (days): 0    Expected Discharge Date: 09/29/2022     Concerns to be Addressed: TCU bed available 9/29        Patient plan of care discussed at interdisciplinary rounds: Yes    Anticipated Discharge Disposition: Transitional Care     Anticipated Discharge Services: TCU  Anticipated Discharge DME:  (no new DME)      Education Provided on the Discharge Plan:  yes  Patient/Family in Agreement with the Plan: yes       Additional Information:    Patient accepted for TCU admission at [facility] for 9/29. PAS completed. Confirmed with [facility] [phone]. Confirmed with daughter Carolyn. Carolyn will transport at 1000.     3:30 PM PT update on car transfers, stating may be difficult for patient. Spoke with Carolyn and patient. Both agree to [facility] and acknowledge potential cost of ride. [facility] wheelchair ride scheduled for 1000.          Mayela Abad RN

## 2022-09-28 NOTE — PROGRESS NOTES
"PRIMARY DIAGNOSIS: \"GENERIC\" NURSING  OUTPATIENT/OBSERVATION GOALS TO BE MET BEFORE DISCHARGE:  1. ADLs back to baseline: No    2. Activity and level of assistance: Assist x1 with walker    3. Pain status: Complains of left and right hip pain and right shoulder pain. Has been taking scheduled tylenol and lidocaine patch.    4. Return to near baseline physical activity: No. Sits up in chair.     Discharge Planner Nurse   Safe discharge environment identified: Yes  Barriers to discharge: No       Entered by: Sadaf Benavidez 09/28/2022 9:56 AM      Please review provider order for any additional goals.   Nurse to notify provider when observation goals have been met and patient is ready for discharge.  "

## 2022-09-28 NOTE — PLAN OF CARE
PRIMARY DIAGNOSIS: GENERALIZED WEAKNESS    OUTPATIENT/OBSERVATION GOALS TO BE MET BEFORE DISCHARGE  1. Orthostatic performed: N/A    2. Tolerating PO medications: Yes    3. Return to near baseline physical activity: Yes    4. Cleared for discharge by consultants (if involved): No    Discharge Planner Nurse   Safe discharge environment identified: Yes  Barriers to discharge: Yes       Entered by: Wanda Reina RN 09/28/2022 6:01 AM    Pt reported some mild discomfort in legs, pain managed with scheduled pain medications. Pt denies nausea. Pt slept overnight. VS unchanged.    Wanda Reina RN    Please review provider order for any additional goals.   Nurse to notify provider when observation goals have been met and patient is ready for discharge.

## 2022-09-28 NOTE — PROGRESS NOTES
"PRIMARY DIAGNOSIS: \"GENERIC\" NURSING  OUTPATIENT/OBSERVATION GOALS TO BE MET BEFORE DISCHARGE:  1. ADLs back to baseline: No    2. Activity and level of assistance: Assist x1 with walker. Worked with PT    3. Pain status: Complains of left and right hip pain and right shoulder pain. Has been taking scheduled tylenol and lidocaine patch.    4. Return to near baseline physical activity: No. Sits up in chair.     Discharge Planner Nurse   Safe discharge environment identified: Yes  Barriers to discharge: No       Entered by: Sadaf Benavidez 09/28/2022 5:55 PM     Pt has been getting up from chair and bed independently and forgetting to use call light/ask for help. Continue to monitor and make sure all alarms are on.    Please review provider order for any additional goals.   Nurse to notify provider when observation goals have been met and patient is ready for discharge.  "

## 2022-09-29 VITALS
HEIGHT: 66 IN | DIASTOLIC BLOOD PRESSURE: 73 MMHG | TEMPERATURE: 97.3 F | SYSTOLIC BLOOD PRESSURE: 176 MMHG | WEIGHT: 161.38 LBS | OXYGEN SATURATION: 99 % | HEART RATE: 63 BPM | BODY MASS INDEX: 25.94 KG/M2 | RESPIRATION RATE: 16 BRPM

## 2022-09-29 PROCEDURE — G0008 ADMIN INFLUENZA VIRUS VAC: HCPCS | Performed by: FAMILY MEDICINE

## 2022-09-29 PROCEDURE — 90662 IIV NO PRSV INCREASED AG IM: CPT | Performed by: FAMILY MEDICINE

## 2022-09-29 PROCEDURE — 99217 PR OBSERVATION CARE DISCHARGE: CPT | Mod: GC | Performed by: STUDENT IN AN ORGANIZED HEALTH CARE EDUCATION/TRAINING PROGRAM

## 2022-09-29 PROCEDURE — 250N000013 HC RX MED GY IP 250 OP 250 PS 637: Performed by: STUDENT IN AN ORGANIZED HEALTH CARE EDUCATION/TRAINING PROGRAM

## 2022-09-29 PROCEDURE — G0378 HOSPITAL OBSERVATION PER HR: HCPCS

## 2022-09-29 PROCEDURE — 250N000011 HC RX IP 250 OP 636: Performed by: FAMILY MEDICINE

## 2022-09-29 RX ADMIN — PANTOPRAZOLE SODIUM 40 MG: 40 TABLET, DELAYED RELEASE ORAL at 08:23

## 2022-09-29 RX ADMIN — LEVOTHYROXINE SODIUM 88 MCG: 88 TABLET ORAL at 08:23

## 2022-09-29 RX ADMIN — LIDOCAINE PATCH 4% 1 PATCH: 40 PATCH TOPICAL at 08:23

## 2022-09-29 RX ADMIN — APIXABAN 2.5 MG: 2.5 TABLET, FILM COATED ORAL at 08:23

## 2022-09-29 RX ADMIN — INFLUENZA A VIRUS A/VICTORIA/2570/2019 IVR-215 (H1N1) ANTIGEN (FORMALDEHYDE INACTIVATED), INFLUENZA A VIRUS A/DARWIN/9/2021 SAN-010 (H3N2) ANTIGEN (FORMALDEHYDE INACTIVATED), INFLUENZA B VIRUS B/PHUKET/3073/2013 ANTIGEN (FORMALDEHYDE INACTIVATED), AND INFLUENZA B VIRUS B/MICHIGAN/01/2021 ANTIGEN (FORMALDEHYDE INACTIVATED) 0.7 ML: 60; 60; 60; 60 INJECTION, SUSPENSION INTRAMUSCULAR at 10:00

## 2022-09-29 RX ADMIN — OXYCODONE HYDROCHLORIDE 2.5 MG: 5 TABLET ORAL at 05:35

## 2022-09-29 RX ADMIN — ACETAMINOPHEN 975 MG: 325 TABLET, FILM COATED ORAL at 05:35

## 2022-09-29 ASSESSMENT — ACTIVITIES OF DAILY LIVING (ADL)
ADLS_ACUITY_SCORE: 42

## 2022-09-29 NOTE — PLAN OF CARE
PRIMARY DIAGNOSIS: GENERALIZED WEAKNESS    OUTPATIENT/OBSERVATION GOALS TO BE MET BEFORE DISCHARGE  1. Orthostatic performed: N/A    2. Tolerating PO medications: Yes    3. Return to near baseline physical activity: Yes    4. Cleared for discharge by consultants (if involved): No    Discharge Planner Nurse   Safe discharge environment identified: Yes  Barriers to discharge: Yes       Entered by: Wanda Reina RN 09/28/2022 10:07 PM    Please review provider order for any additional goals.   Nurse to notify provider when observation goals have been met and patient is ready for discharge.

## 2022-09-29 NOTE — PLAN OF CARE
Occupational Therapy Discharge Summary    Reason for therapy discharge:    Discharged to TCU.    Progress towards therapy goal(s). See goals on Care Plan in [facility] electronic health record for goal details.  Making progress towards goals.    Therapy recommendation(s):    Recommend continued OT @ TCU.

## 2022-09-29 NOTE — PLAN OF CARE
PRIMARY DIAGNOSIS: GENERALIZED WEAKNESS    OUTPATIENT/OBSERVATION GOALS TO BE MET BEFORE DISCHARGE  1. Orthostatic performed: No    2. Tolerating PO medications: Yes    3. Return to near baseline physical activity: Yes    4. Cleared for discharge by consultants (if involved): Yes    Discharge Planner Nurse   Safe discharge environment identified: Yes  Barriers to discharge: No       Entered by: Naila Vasquez RN 09/29/2022 10:23 AM     Discharging per wheelchair to [facility] TCU. Daughter here to follow patient to facility Telephone report given to TCU staff. Flu vaccine given prior to discharge

## 2022-09-29 NOTE — PROGRESS NOTES
Physical Therapy Discharge Summary    Reason for therapy discharge:    Discharged to transitional care facility.    Progress towards therapy goal(s). See goals on Care Plan in [facility] electronic health record for goal details.  Goals partially met.  Barriers to achieving goals:   discharge from facility.    Therapy recommendation(s):    Continued therapy is recommended.  Rationale/Recommendations:  TCU to progress functional mobility and strength.

## 2022-09-29 NOTE — PLAN OF CARE
PRIMARY DIAGNOSIS: GENERALIZED WEAKNESS    OUTPATIENT/OBSERVATION GOALS TO BE MET BEFORE DISCHARGE  1. Orthostatic performed: N/A    2. Tolerating PO medications: Yes    3. Return to near baseline physical activity: Yes    4. Cleared for discharge by consultants (if involved): No    Discharge Planner Nurse   Safe discharge environment identified: Yes  Barriers to discharge: Yes       Entered by: Wanda Reina RN 09/29/2022 4:38 AM    Please review provider order for any additional goals.   Nurse to notify provider when observation goals have been met and patient is ready for discharge.

## 2022-09-29 NOTE — PLAN OF CARE
PRIMARY DIAGNOSIS: GENERALIZED WEAKNESS    OUTPATIENT/OBSERVATION GOALS TO BE MET BEFORE DISCHARGE  1. Orthostatic performed: N/A    2. Tolerating PO medications: Yes    3. Return to near baseline physical activity: Yes    4. Cleared for discharge by consultants (if involved): No    Discharge Planner Nurse   Safe discharge environment identified: Yes  Barriers to discharge: Yes       Entered by: Wanda Reina RN 09/29/2022 5:18 AM    Pt reported pain 7/10 overnight. Pain managed with scheduled acetaminophen and PRN oxycodone. Pt denies nausea. Pt slept overnight. VS unchanged.  Wanda Reina RN      Please review provider order for any additional goals.   Nurse to notify provider when observation goals have been met and patient is ready for discharge.

## 2022-09-29 NOTE — DISCHARGE SUMMARY
[facility]  Discharge Summary - Medicine & Pediatrics       Date of Admission:  9/24/2022  Date of Discharge:  9/29/2022 10:15 AM  Discharging Provider: Negar Regalado MD  Discharge Service: Hospitalist Service    Discharge Diagnoses   (W19.XXXA) Fall, initial encounter  (M25.552) Hip pain, left  (R26.2) Unable to ambulate  Late onset Dementia      Follow-ups Needed After Discharge   Follow-up Appointments     Follow Up and recommended labs and tests      Follow up with jail physician.  The following labs/tests are   recommended: Follow BMP--creatinine mildly elevated.               Discharge Disposition   Discharged to home  Condition at discharge: [facility] Course     Marycarmen Warren is a 87 year old female admitted on 9/24/2022. She has a history of A. fib on anticoagulation and hypothyroidism and is admitted after a mechanical fall. Medically cleared for placement on 9/26/2022. The following problems were addressed during her hospitalization:      Mechanical fall  T2 compression fracture, age undetermined  Patient with a fall the day prior to admission, and a fall on the day of admission.    X-rays of left shoulder, right knee and right ankle, pelvis without fracture.  CT head and spine showing T2 compression fracture with age indeterminate. Neurosurgery evaluated and recommended ongoing PT/OT, no indication for TSLO brace. Pain was managed with tylenol, minimal oxycodone and lidocaine patches. She is discharged to a long term care facility for further rehab and higher level of care.    Dementia  SLUMS 18  SLUMS by OT preformed, 18 indicated cognitive impairment. With ongoing memory concerns by daughter and with recent falls forgetting to press life alert button, diagnosis of dementia is appropriate, plan for discharge to higher level of care.    RJ, improving  Elevated CK, resolved  Patient with creatinine of 1.47, unable to see previous records, so uncertain what her  baseline creatinine function is. CK elevated to 732, could be contributing to RJ as well.  RJ, likely secondary to lack of intake due to fall. Cr down trending, stable prior to discharge 1.07, would repeat BMP to establish Cr baseline.    Consultations This Hospital Stay   PHYSICAL THERAPY ADULT IP CONSULT  OCCUPATIONAL THERAPY ADULT IP CONSULT  NEUROSURGERY IP CONSULT  PHYSICAL THERAPY ADULT IP CONSULT  OCCUPATIONAL THERAPY ADULT IP CONSULT    Code Status   No CPR- Do NOT Intubate     The patient was discussed with Dr. Mcmahon.    Negar Regalado MD  [facility]  [address] [address]  Phone: [phone]  Fax: [phone]  ______________________________________________________________________    Physical Exam   Vital Signs: Temp: 97.3  F (36.3  C) Temp src: Oral BP: (!) 176/73 Pulse: 63   Resp: 16 SpO2: 99 % O2 Device: None (Room air)    Weight: 161 lbs 6 oz     Constitutional: awake, alert, cooperative, no apparent distress, and appears stated age  Eyes: Lids and lashes normal, pupils equal, round and reactive to light, extra ocular muscles intact, sclera clear, conjunctiva normal  Respiratory: No increased work of breathing, good air exchange, clear to auscultation bilaterally, no crackles or wheezing  Cardiovascular: Normal apical impulse, regular rate and rhythm, normal S1 and S2, no S3 or S4, and no murmur noted  Skin: no bruising or bleeding  Musculoskeletal: +1 pitting edema on shins bilaterally, tender to touch.    No erythema noted.  Neurologic: Awake, alert, oriented to name, place and time.   Neuropsychiatric: General: normal, calm and normal eye contact      Primary Care Physician   Gabe Caban MD    Discharge Orders      General info for SNF    Length of Stay Estimate: Long Term Care  Condition at Discharge: Stable  Level of care:skilled   Rehabilitation Potential: Fair  Admission H&P remains valid and up-to-date:  Yes  Recent Chemotherapy: N/A  Use Nursing Home Standing Orders: Yes     Mantoux instructions    Give two-step Mantoux (PPD) Per Facility Policy Yes     Follow Up and recommended labs and tests    Follow up with [facility] physician.  The following labs/tests are recommended: Follow BMP--creatinine mildly elevated.     Reason for your hospital stay    Mechanical falls, needing higher level of care     Activity - Up with nursing assistance     Physical Therapy Adult Consult    Evaluate and treat as clinically indicated.    Reason:  Recent multiple mechanical fall.     Occupational Therapy Adult Consult    Evaluate and treat as clinically indicated.    Reason:  Recent history of multiple mechanical falls.     Fall precautions     Diet    Follow this diet upon discharge: Orders Placed This Encounter      Combination Diet Regular Diet Adult       Significant Results and Procedures    Recent Results (from the past 48 hour(s))   Basic metabolic panel    Collection Time: 09/28/22  6:11 AM   Result Value Ref Range    Sodium 142 136 - 145 mmol/L    Potassium 4.4 3.4 - 5.3 mmol/L    Chloride 108 (H) 98 - 107 mmol/L    Carbon Dioxide (CO2) 26 22 - 29 mmol/L    Anion Gap 8 7 - 15 mmol/L    Urea Nitrogen 30.4 (H) 8.0 - 23.0 mg/dL    Creatinine 1.07 (H) 0.51 - 0.95 mg/dL    Calcium 8.8 8.8 - 10.2 mg/dL    Glucose 102 (H) 70 - 99 mg/dL    GFR Estimate 50 (L) >60 mL/min/1.73m2   CBC with platelets    Collection Time: 09/28/22  6:11 AM   Result Value Ref Range    WBC Count 7.6 4.0 - 11.0 10e3/uL    RBC Count 3.96 3.80 - 5.20 10e6/uL    Hemoglobin 11.7 11.7 - 15.7 g/dL    Hematocrit 35.6 35.0 - 47.0 %    MCV 90 78 - 100 fL    MCH 29.5 26.5 - 33.0 pg    MCHC 32.9 31.5 - 36.5 g/dL    RDW 14.1 10.0 - 15.0 %    Platelet Count 248 150 - 450 10e3/uL         Results for orders placed or performed during the hospital encounter of 09/24/22   Head CT w/o contrast    Narrative    EXAM: CT HEAD WITHOUT CONTRAST, CT CERVICAL SPINE WITHOUT  CONTRAST  LOCATION: [facility]  DATE/TIME: 09/24/2022, 8:30 AM    INDICATION: Head and cervical spine trauma, pain.  COMPARISON: None.  TECHNIQUE:   1) Routine CT Head without IV contrast. Multiplanar reformats. Dose reduction techniques were used.  2) Routine CT Cervical Spine without IV contrast. Multiplanar reformats. Dose reduction techniques were used.    FINDINGS:   HEAD CT:   INTRACRANIAL CONTENTS: No intracranial hemorrhage, extra-axial collection, or mass effect.  No CT evidence of acute infarct. Mild to moderate presumed chronic small vessel ischemic changes throughout the cerebral hemispheric white matter. Tiny chronic   wedge-shaped infarct within the right cerebellar hemisphere. Mild to moderate generalized volume loss. No hydrocephalus.     VISUALIZED ORBITS/SINUSES/MASTOIDS: Prior bilateral cataract surgery. Visualized portions of the orbits are otherwise unremarkable. No paranasal sinus mucosal disease. No middle ear or mastoid effusion.    BONES/SOFT TISSUES: No acute abnormality.    CERVICAL SPINE CT:   VERTEBRA: T2 superior endplate compression fracture with approximately 25-35% loss of vertebral body height. No retropulsed fracture fragments, visible fracture line or associated perivertebral soft tissue edema. There is 2 mm degenerative stepwise   anterolisthesis C3-C5 and 2 mm degenerative anterolisthesis of C7 on T1. Exaggeration of the normal cervical lordosis and upper thoracic kyphosis. Normal cervical vertebral body heights. No fracture or subluxation of the cervical spine. Multilevel   cervical spondylosis with loss of disc height, disc osteophyte complex formation, uncinate spurring and facet arthrosis. Degenerative fusion across the facet joint on the left at C3-C4.    CANAL/FORAMINA: Moderate bilateral neural foraminal stenosis C2-C5. Mild to moderate bilateral neural foraminal stenosis C5-C7. Mild spinal canal stenosis C3-C5.    PARASPINAL: Chronic healed left  posterior fourth rib fracture. The visualized lung apices are clear. The paraspinous soft tissues are unremarkable.      Impression    IMPRESSION:  HEAD CT:  1.  No acute intracranial process.  2.  Mild to moderate chronic small vessel ischemic disease and generalized brain parenchymal volume loss.    CERVICAL SPINE CT:  1.  Age-indeterminate mild to moderate T2 superior endplate compression fracture.  2.  No fracture or traumatic subluxation of the cervical spine.  3.  Multilevel cervical spondylosis with degenerative spondylolisthesis.     Cervical spine CT w/o contrast    Narrative    EXAM: CT HEAD WITHOUT CONTRAST, CT CERVICAL SPINE WITHOUT CONTRAST  LOCATION: [facility]  DATE/TIME: 09/24/2022, 8:30 AM    INDICATION: Head and cervical spine trauma, pain.  COMPARISON: None.  TECHNIQUE:   1) Routine CT Head without IV contrast. Multiplanar reformats. Dose reduction techniques were used.  2) Routine CT Cervical Spine without IV contrast. Multiplanar reformats. Dose reduction techniques were used.    FINDINGS:   HEAD CT:   INTRACRANIAL CONTENTS: No intracranial hemorrhage, extra-axial collection, or mass effect.  No CT evidence of acute infarct. Mild to moderate presumed chronic small vessel ischemic changes throughout the cerebral hemispheric white matter. Tiny chronic   wedge-shaped infarct within the right cerebellar hemisphere. Mild to moderate generalized volume loss. No hydrocephalus.     VISUALIZED ORBITS/SINUSES/MASTOIDS: Prior bilateral cataract surgery. Visualized portions of the orbits are otherwise unremarkable. No paranasal sinus mucosal disease. No middle ear or mastoid effusion.    BONES/SOFT TISSUES: No acute abnormality.    CERVICAL SPINE CT:   VERTEBRA: T2 superior endplate compression fracture with approximately 25-35% loss of vertebral body height. No retropulsed fracture fragments, visible fracture line or associated perivertebral soft tissue edema. There is 2 mm  degenerative stepwise   anterolisthesis C3-C5 and 2 mm degenerative anterolisthesis of C7 on T1. Exaggeration of the normal cervical lordosis and upper thoracic kyphosis. Normal cervical vertebral body heights. No fracture or subluxation of the cervical spine. Multilevel   cervical spondylosis with loss of disc height, disc osteophyte complex formation, uncinate spurring and facet arthrosis. Degenerative fusion across the facet joint on the left at C3-C4.    CANAL/FORAMINA: Moderate bilateral neural foraminal stenosis C2-C5. Mild to moderate bilateral neural foraminal stenosis C5-C7. Mild spinal canal stenosis C3-C5.    PARASPINAL: Chronic healed left posterior fourth rib fracture. The visualized lung apices are clear. The paraspinous soft tissues are unremarkable.      Impression    IMPRESSION:  HEAD CT:  1.  No acute intracranial process.  2.  Mild to moderate chronic small vessel ischemic disease and generalized brain parenchymal volume loss.    CERVICAL SPINE CT:  1.  Age-indeterminate mild to moderate T2 superior endplate compression fracture.  2.  No fracture or traumatic subluxation of the cervical spine.  3.  Multilevel cervical spondylosis with degenerative spondylolisthesis.     CTA Chest Abdomen Pelvis w Contrast    Narrative    EXAM: CTA CHEST ABDOMEN PELVIS W CONTRAST  LOCATION: [facility]  DATE/TIME: 9/24/2022 8:32 AM    INDICATION: Fall. Pain.  COMPARISON: None.  TECHNIQUE: CT angiogram chest abdomen pelvis during arterial phase of injection of IV contrast. 2D and 3D MIP reconstructions were performed by the CT technologist. Dose reduction techniques were used.   CONTRAST: Isovue 370     75ml    FINDINGS:   CT ANGIOGRAM CHEST, ABDOMEN, AND PELVIS: No mediastinal hematoma. Aorta is normal caliber without dissection or aneurysm. Branches are well opacified. There are single bilateral renal arteries. No iliac artery aneurysm.    No central pulmonary emboli.    LUNGS AND PLEURA:  There is an irregular rim calcified nodule in the inferior right upper lobe with adjacent tiny micronodules. No hydropneumothorax.    MEDIASTINUM/AXILLAE: Calcified mediastinal and right hilar nodes. There is a very small hiatal hernia.    CORONARY ARTERY CALCIFICATION: Mild.    HEPATOBILIARY: Multiple tiny dependent gallstones.    PANCREAS: Diffuse fatty infiltration    SPLEEN: Normal.    ADRENAL GLANDS: Normal.    KIDNEYS/BLADDER: Normal.    BOWEL: Redundant colon. Moderate stool burden in the rectosigmoid with a moderate to large rectocele. No hemoperitoneum.    LYMPH NODES: Normal.    PELVIC ORGANS: Hysterectomy.    MUSCULOSKELETAL: No acute fractures are soft tissue hematomas. There multiple old left rib fractures.      Impression    IMPRESSION:  1.  No evidence of acute traumatic injury to the chest, abdomen or pelvis.  2.  There is a large rectocele.  3.  There is a small hiatal hernia.  4.  Cholelithiasis.  5.  Evidence of previous granulomatous disease.     XR Shoulder Left 2 Views    Narrative    EXAM: XR SHOULDER LEFT 2 VIEWS  LOCATION: [facility]  DATE/TIME: 09/24/2022, 10:23 AM    INDICATION: Left-sided shoulder pain after trauma.  COMPARISON: None.      Impression    IMPRESSION:  1.  Partially limited evaluation due to positioning.  2.  No evidence of acute fracture or joint malalignment.  3.  Multiple old healed left rib fractures.  4.  Bone demineralization.     Ankle XR, G/E 3 views, right    Narrative    EXAM: XR ANKLE RIGHT G/E 3 VIEWS  LOCATION: [facility]  DATE/TIME: 9/24/2022 10:25 AM    INDICATION: Right ankle pain after trauma.  COMPARISON: None.      Impression    IMPRESSION:  1.  Normal joint spacing and alignment.  2.  No fracture.  3.  Bone demineralization.   XR Knee Right 1/2 Views    Narrative    EXAM: XR KNEE RIGHT 1/2 VIEWS  LOCATION: [facility]  DATE/TIME: 9/24/2022 10:25 AM    INDICATION: Right-sided knee  pain after a fall.  COMPARISON: None.      Impression    IMPRESSION:  1.  No fracture or joint malalignment.  2.  Mild right knee degenerative arthrosis. Mild medial compartment narrowing. Mild tricompartmental marginal osteophytosis. No joint effusion.  3.  Bone demineralization.   XR Hip Bilateral 1 View Each    Narrative    EXAM: XR PELVIS WITH 2 VIEW HIPS BILATERAL  LOCATION: [facility]  DATE/TIME: 09/24/2022, 3:03 PM    INDICATION: Fall, tender to palpation.  COMPARISON: None.      Impression    IMPRESSION: Degenerative changes in the visualized lumbar spine and in the SI joints. Diffuse bone demineralization. No evidence of fracture.     CT Head w/o Contrast    Narrative    EXAM: CT HEAD W/O CONTRAST  LOCATION: [facility]  DATE/TIME: 9/25/2022 12:12 PM    INDICATION: Head trauma, pain  COMPARISON: Head CT 09/24/2022.  TECHNIQUE: Routine CT Head without IV contrast. Multiplanar reformats. Dose reduction techniques were used.    FINDINGS:  INTRACRANIAL CONTENTS: No intracranial hemorrhage, extraaxial collection, or mass effect.  No CT evidence of acute infarct. Mild to moderate presumed chronic small vessel ischemic changes throughout the cerebral hemispheric white matter. 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 Mild to moderate generalized volume loss. No hydrocephalus.     VISUALIZED ORBITS/SINUSES/MASTOIDS: No intraorbital abnormality. No paranasal sinus mucosal disease. No middle ear or mastoid effusion.    BONES/SOFT TISSUES: No acute abnormality.      Impression    IMPRESSION:  1.  No acute intracranial process. No significant change since 09/24/2022.  2.  Stable mild to moderate chronic small vessel ischemic disease and generalized brain parenchymal volume loss.       Discharge Medications   Current Discharge Medication List      START taking these medications    Details   Lidocaine (LIDOCARE) 4 % Patch Place 1  patch onto the skin every 24 hours To prevent lidocaine toxicity, patient should be patch free for 12 hrs daily.  Qty: 7 patch, Refills: 0    Associated Diagnoses: Fall, initial encounter; Hip pain, left; Unable to ambulate      oxyCODONE (ROXICODONE) 5 MG tablet Take 1 tablet (5 mg) by mouth every 6 hours as needed for moderate to severe pain  Qty: 7 tablet, Refills: 0    Associated Diagnoses: Fall, initial encounter; Hip pain, left; Unable to ambulate         CONTINUE these medications which have NOT CHANGED    Details   acetaminophen (TYLENOL) 500 MG tablet Take 500 mg by mouth every 6 hours as needed for pain      apixaban ANTICOAGULANT (ELIQUIS) 2.5 MG tablet Take 2.5 mg by mouth 2 times daily      calcium carbonate 600 mg-vitamin D 400 units (CALTRATE) 600-400 MG-UNIT per tablet Take 1 tablet by mouth 2 times daily      cyanocobalamin (VITAMIN B-12) 1000 MCG tablet Take 1,000 mcg by mouth daily      levothyroxine (SYNTHROID/LEVOTHROID) 88 MCG tablet Take 88 mcg by mouth daily      melatonin 5 MG tablet Take 5 mg by mouth At Bedtime      Menthol, Topical Analgesic, (BIOFREEZE) 4 % GEL Externally apply 1 Application topically 2 times daily To right knee      multivitamin, therapeutic (THERA-VIT) TABS tablet Take 1 tablet by mouth daily      pantoprazole (PROTONIX) 40 MG EC tablet Take 40 mg by mouth daily           Allergies   No Known Allergies

## 2022-09-30 ENCOUNTER — PATIENT OUTREACH (OUTPATIENT)
Dept: CARE COORDINATION | Facility: CLINIC | Age: 87
End: 2022-09-30

## 2022-09-30 NOTE — PROGRESS NOTES
[facility]    Background: Transitional Care Management program auto-identified and prompting a chart review by [facility] team.    Assessment: Upon chart review, [facility] Team member will cancel/close this episode of Transitional Care Management program due to reason below:    Patient has discharged to a Memory Care, Nursing Home, Assisted Living or Group Home where patient is receiving on-site support with their daily cares, including support with hospital follow up plan.    Plan: Transitional Care Management episode closed per reason above.      YONY Salazar  [facility] [place], [facility]    *Connected Care Resource Team does NOT follow patient ongoing. Referrals are identified based on internal discharge reports and the outreach is to ensure patient has an understanding of their discharge instructions.

## 2022-10-01 ENCOUNTER — LAB REQUISITION (OUTPATIENT)
Dept: LAB | Facility: CLINIC | Age: 87
End: 2022-10-01
Payer: COMMERCIAL

## 2022-10-01 DIAGNOSIS — R79.89 OTHER SPECIFIED ABNORMAL FINDINGS OF BLOOD CHEMISTRY: ICD-10-CM

## 2022-10-04 LAB
ANION GAP SERPL CALCULATED.3IONS-SCNC: 9 MMOL/L (ref 7–15)
BUN SERPL-MCNC: 39.4 MG/DL (ref 8–23)
CALCIUM SERPL-MCNC: 9.2 MG/DL (ref 8.8–10.2)
CHLORIDE SERPL-SCNC: 105 MMOL/L (ref 98–107)
CREAT SERPL-MCNC: 1.2 MG/DL (ref 0.51–0.95)
DEPRECATED HCO3 PLAS-SCNC: 26 MMOL/L (ref 22–29)
GFR SERPL CREATININE-BSD FRML MDRD: 44 ML/MIN/1.73M2
GLUCOSE SERPL-MCNC: 85 MG/DL (ref 70–99)
POTASSIUM SERPL-SCNC: 4.4 MMOL/L (ref 3.4–5.3)
SODIUM SERPL-SCNC: 140 MMOL/L (ref 136–145)

## 2022-10-04 PROCEDURE — 36415 COLL VENOUS BLD VENIPUNCTURE: CPT | Performed by: NURSE PRACTITIONER

## 2022-10-04 PROCEDURE — P9604 ONE-WAY ALLOW PRORATED TRIP: HCPCS | Performed by: NURSE PRACTITIONER

## 2022-10-04 PROCEDURE — 80048 BASIC METABOLIC PNL TOTAL CA: CPT | Performed by: NURSE PRACTITIONER

## 2022-10-29 ENCOUNTER — LAB REQUISITION (OUTPATIENT)
Dept: LAB | Facility: CLINIC | Age: 87
End: 2022-10-29

## 2022-10-29 DIAGNOSIS — R36.0 URETHRAL DISCHARGE WITHOUT BLOOD: ICD-10-CM

## 2022-10-29 LAB
ALBUMIN UR-MCNC: NEGATIVE MG/DL
APPEARANCE UR: CLEAR
BILIRUB UR QL STRIP: NEGATIVE
COLOR UR AUTO: YELLOW
GLUCOSE UR STRIP-MCNC: NEGATIVE MG/DL
HGB UR QL STRIP: NEGATIVE
KETONES UR STRIP-MCNC: NEGATIVE MG/DL
LEUKOCYTE ESTERASE UR QL STRIP: NEGATIVE
MUCOUS THREADS #/AREA URNS LPF: PRESENT /LPF
NITRATE UR QL: NEGATIVE
PH UR STRIP: 5.5 [PH] (ref 5–7)
RBC URINE: <1 /HPF
SP GR UR STRIP: 1.02 (ref 1–1.03)
SQUAMOUS EPITHELIAL: 1 /HPF
UROBILINOGEN UR STRIP-MCNC: NORMAL MG/DL
WBC URINE: <1 /HPF

## 2022-10-29 PROCEDURE — 81001 URINALYSIS AUTO W/SCOPE: CPT | Performed by: FAMILY MEDICINE

## 2022-11-23 ENCOUNTER — LAB REQUISITION (OUTPATIENT)
Dept: LAB | Facility: CLINIC | Age: 87
End: 2022-11-23
Payer: MEDICARE

## 2022-11-23 DIAGNOSIS — R30.0 DYSURIA: ICD-10-CM

## 2022-11-23 LAB
ALBUMIN UR-MCNC: 20 MG/DL
APPEARANCE UR: ABNORMAL
BILIRUB UR QL STRIP: NEGATIVE
COLOR UR AUTO: YELLOW
GLUCOSE UR STRIP-MCNC: NEGATIVE MG/DL
HGB UR QL STRIP: ABNORMAL
HYALINE CASTS: 7 /LPF
KETONES UR STRIP-MCNC: NEGATIVE MG/DL
LEUKOCYTE ESTERASE UR QL STRIP: ABNORMAL
MUCOUS THREADS #/AREA URNS LPF: PRESENT /LPF
NITRATE UR QL: NEGATIVE
PH UR STRIP: 5.5 [PH] (ref 5–7)
RBC URINE: 4 /HPF
SP GR UR STRIP: 1.02 (ref 1–1.03)
SQUAMOUS EPITHELIAL: 1 /HPF
TRANSITIONAL EPI: 1 /HPF
UROBILINOGEN UR STRIP-MCNC: NORMAL MG/DL
WBC CLUMPS #/AREA URNS HPF: PRESENT /HPF
WBC URINE: >182 /HPF

## 2022-11-23 PROCEDURE — 87088 URINE BACTERIA CULTURE: CPT | Performed by: FAMILY MEDICINE

## 2022-11-23 PROCEDURE — 81001 URINALYSIS AUTO W/SCOPE: CPT | Mod: ORL | Performed by: FAMILY MEDICINE

## 2022-11-25 LAB — BACTERIA UR CULT: ABNORMAL

## 2022-12-01 ENCOUNTER — DOCUMENTATION ONLY (OUTPATIENT)
Dept: OTHER | Facility: CLINIC | Age: 87
End: 2022-12-01

## 2022-12-14 ENCOUNTER — LAB REQUISITION (OUTPATIENT)
Dept: LAB | Facility: CLINIC | Age: 87
End: 2022-12-14
Payer: MEDICARE

## 2022-12-14 DIAGNOSIS — N39.41 URGE INCONTINENCE: ICD-10-CM

## 2022-12-14 DIAGNOSIS — R35.0 FREQUENCY OF MICTURITION: ICD-10-CM

## 2022-12-14 LAB
ALBUMIN UR-MCNC: NEGATIVE MG/DL
APPEARANCE UR: CLEAR
BILIRUB UR QL STRIP: NEGATIVE
COLOR UR AUTO: NORMAL
GLUCOSE UR STRIP-MCNC: NEGATIVE MG/DL
HGB UR QL STRIP: NEGATIVE
KETONES UR STRIP-MCNC: NEGATIVE MG/DL
LEUKOCYTE ESTERASE UR QL STRIP: NEGATIVE
NITRATE UR QL: NEGATIVE
PH UR STRIP: 6 [PH] (ref 5–7)
SP GR UR STRIP: 1.02 (ref 1–1.03)
UROBILINOGEN UR STRIP-MCNC: NORMAL MG/DL

## 2022-12-14 PROCEDURE — 87086 URINE CULTURE/COLONY COUNT: CPT | Mod: ORL | Performed by: NURSE PRACTITIONER

## 2022-12-14 PROCEDURE — 81003 URINALYSIS AUTO W/O SCOPE: CPT | Mod: ORL | Performed by: NURSE PRACTITIONER

## 2022-12-16 LAB — BACTERIA UR CULT: NO GROWTH

## 2023-03-01 ENCOUNTER — LAB REQUISITION (OUTPATIENT)
Dept: LAB | Facility: CLINIC | Age: 88
End: 2023-03-01
Payer: MEDICARE

## 2023-03-01 DIAGNOSIS — N18.30 CHRONIC KIDNEY DISEASE, STAGE 3 UNSPECIFIED (H): ICD-10-CM

## 2023-03-01 DIAGNOSIS — E03.9 HYPOTHYROIDISM, UNSPECIFIED: ICD-10-CM

## 2023-03-02 LAB
ANION GAP SERPL CALCULATED.3IONS-SCNC: 11 MMOL/L (ref 7–15)
BUN SERPL-MCNC: 26.1 MG/DL (ref 8–23)
CALCIUM SERPL-MCNC: 9.3 MG/DL (ref 8.8–10.2)
CHLORIDE SERPL-SCNC: 108 MMOL/L (ref 98–107)
CREAT SERPL-MCNC: 1.24 MG/DL (ref 0.51–0.95)
DEPRECATED HCO3 PLAS-SCNC: 22 MMOL/L (ref 22–29)
GFR SERPL CREATININE-BSD FRML MDRD: 42 ML/MIN/1.73M2
GLUCOSE SERPL-MCNC: 85 MG/DL (ref 70–99)
POTASSIUM SERPL-SCNC: 4.5 MMOL/L (ref 3.4–5.3)
SODIUM SERPL-SCNC: 141 MMOL/L (ref 136–145)
TSH SERPL DL<=0.005 MIU/L-ACNC: 2.03 UIU/ML (ref 0.3–4.2)

## 2023-03-02 PROCEDURE — 36415 COLL VENOUS BLD VENIPUNCTURE: CPT | Mod: ORL | Performed by: NURSE PRACTITIONER

## 2023-03-02 PROCEDURE — 84443 ASSAY THYROID STIM HORMONE: CPT | Mod: ORL | Performed by: NURSE PRACTITIONER

## 2023-03-02 PROCEDURE — P9604 ONE-WAY ALLOW PRORATED TRIP: HCPCS | Mod: ORL | Performed by: NURSE PRACTITIONER

## 2023-03-02 PROCEDURE — 80048 BASIC METABOLIC PNL TOTAL CA: CPT | Mod: ORL | Performed by: NURSE PRACTITIONER

## 2023-06-07 NOTE — H&P
[facility]    History and Physical - Hospitalist Service       Date of Admission:  9/24/2022    Assessment & Plan      Marycarmen Warren is a 87 year old female admitted on 9/24/2022. She has a history of A. fib on anticoagulation and hypothyroidism and is admitted after a mechanical fall.    Mechanical fall  Patient with a fall the day prior to admission, and a fall on the day of admission.  Per patient she tripped and fell after leaving the bathroom.  She was likely on the ground for a few hours, but cannot recall exactly.  Per daughter patient has a life alert button, however she forgot to push it.  Daughter is concerned about some memory problems.  Patient lives in assisted living and daughter is wondering about needing a higher level of care.  Patient endorses pain in hips bilaterally, CK of 732.  X-rays of left shoulder, right knee and right ankle without fracture.  CT head and cervical spine without fracture  -PT/OT, OT to perform SLUMs  -Schedule Tylenol  -Pelvic x-ray  -Fall precautions    RJ versus CKD  Elevated CK  Patient with creatinine of 1.47, unable to see previous records, so uncertain what her baseline creatinine function is.  CK elevated to 732, could be contributing to RJ as well.  If RJ, likely secondary to lack of intake due to fall.  -BMP in a.m.    Chronic medical conditions  Hypothyroidism: PTA levothyroxine  GERD: PTA Protonix  A. Fib: PTA Xarelto     Diet: 2 Gram Sodium Diet    DVT Prophylaxis: Xarelto  Gonzales Catheter: Not present  Fluids: PO  Central Lines: None  Cardiac Monitoring: None  Code Status:  DNR/DNI    Clinically Significant Risk Factors Present on Admission                          Disposition Plan      Expected Discharge Date: 09/25/2022                The patient's care was discussed with the Attending Physician, Dr. Campa.    Sintia Ruffin MD  Hospitalist Service  [facility]  Securely message with the Vocera Web  Console (learn more here)  Text page via [facility] Paging/Directory       ______________________________________________________________________    Chief Complaint   Fall    History is obtained from the patient  And daughter    History of Present Illness   Marycarmen Warren is a 87 year old female who has a history of a fib on xarelto and hypothyroidism and is admitted for fall.    Per patient and her daughter patient fell yesterday, likely mechanical fall at that time.  Patient has a life alert necklace, however she forgot to press it at that time.  Daughter was have a patient's house yesterday and reminded her before she left that if she falls she needs to push her life alert button.  This morning patient was leaving the bathroom when she tripped and fell.  She again forgot to press her life alert button.  Patient states that she was likely on the ground for a few hours, however she cannot be certain.  She denies fainting, she does endorse severe bilateral hip pain.  Per daughter patient has always been sensitive to pain.  Patient lives in an assisted living, daughter has concerns about memory problems that have been worsening over the last year or so.    Review of Systems    The 10 point Review of Systems is negative other than noted in the HPI.    Past Medical History    I have reviewed this patient's medical history and updated it with pertinent information if needed.   No past medical history on file.     Past Surgical History   I have reviewed this patient's surgical history and updated it with pertinent information if needed.  No past surgical history on file.     Social History   She does not smoke, does not drink.    Family History     No significant family history    Prior to Admission Medications   None     Allergies   No Known Allergies    Physical Exam   Vital Signs: Temp: 97.9  F (36.6  C) Temp src: Oral BP: (!) 155/79 Pulse: 81   Resp: 18 SpO2: 93 % O2 Device: None (Room air)    Weight: 0 lbs 0  oz    Constitutional: awake, alert, cooperative, no apparent distress, and appears stated age  Eyes: Lids and lashes normal, pupils equal, round and reactive to light, extra ocular muscles intact, sclera clear, conjunctiva normal  Respiratory: No increased work of breathing, good air exchange, clear to auscultation bilaterally, no crackles or wheezing  Cardiovascular: Normal apical impulse, regular rate and rhythm, normal S1 and S2, no S3 or S4, and no murmur noted  Skin: no bruising or bleeding  Musculoskeletal: +1 pitting edema on shins bilaterally, tender to touch.  Tenderness to touch of left hip.  No erythema or swelling noted.  Neurologic: Awake, alert, oriented to name, place and time.   Neuropsychiatric: General: normal, calm and normal eye contact    Data   Data reviewed today: I reviewed all medications, new labs and imaging results over the last 24 hours. I personally reviewed no images or EKG's today.    Recent Labs   Lab 09/24/22  0848 09/24/22  0807 09/24/22  0757   WBC  --   --  18.5*   HGB  --   --  13.0   MCV  --   --  90   PLT  --   --  255   INR  --   --  1.02     --   --    POTASSIUM 4.3  --   --    CHLORIDE 106  --   --    CO2 24  --   --    BUN 36.5*  --   --    CR 1.47* 1.6*  --    ANIONGAP 10  --   --    EDMUNDO 8.9  --   --    *  --   --       PAST SURGICAL HISTORY:  No significant past surgical history

## 2023-10-18 ENCOUNTER — LAB REQUISITION (OUTPATIENT)
Dept: LAB | Facility: CLINIC | Age: 88
End: 2023-10-18
Payer: MEDICARE

## 2023-10-18 DIAGNOSIS — I48.0 PAROXYSMAL ATRIAL FIBRILLATION (H): ICD-10-CM

## 2023-10-18 DIAGNOSIS — E53.8 DEFICIENCY OF OTHER SPECIFIED B GROUP VITAMINS: ICD-10-CM

## 2023-10-19 LAB
ANION GAP SERPL CALCULATED.3IONS-SCNC: 11 MMOL/L (ref 7–15)
BUN SERPL-MCNC: 35 MG/DL (ref 8–23)
CALCIUM SERPL-MCNC: 9.6 MG/DL (ref 8.8–10.2)
CHLORIDE SERPL-SCNC: 109 MMOL/L (ref 98–107)
CREAT SERPL-MCNC: 1.59 MG/DL (ref 0.51–0.95)
DEPRECATED HCO3 PLAS-SCNC: 21 MMOL/L (ref 22–29)
EGFRCR SERPLBLD CKD-EPI 2021: 31 ML/MIN/1.73M2
ERYTHROCYTE [DISTWIDTH] IN BLOOD BY AUTOMATED COUNT: 13.8 % (ref 10–15)
GLUCOSE SERPL-MCNC: 93 MG/DL (ref 70–99)
HCT VFR BLD AUTO: 36.7 % (ref 35–47)
HGB BLD-MCNC: 11.6 G/DL (ref 11.7–15.7)
MCH RBC QN AUTO: 30.1 PG (ref 26.5–33)
MCHC RBC AUTO-ENTMCNC: 31.6 G/DL (ref 31.5–36.5)
MCV RBC AUTO: 95 FL (ref 78–100)
PLATELET # BLD AUTO: 237 10E3/UL (ref 150–450)
POTASSIUM SERPL-SCNC: 5 MMOL/L (ref 3.4–5.3)
RBC # BLD AUTO: 3.86 10E6/UL (ref 3.8–5.2)
SODIUM SERPL-SCNC: 141 MMOL/L (ref 135–145)
VIT B12 SERPL-MCNC: 1852 PG/ML (ref 232–1245)
WBC # BLD AUTO: 7.9 10E3/UL (ref 4–11)

## 2023-10-19 PROCEDURE — 36415 COLL VENOUS BLD VENIPUNCTURE: CPT | Mod: ORL | Performed by: NURSE PRACTITIONER

## 2023-10-19 PROCEDURE — 82607 VITAMIN B-12: CPT | Mod: ORL | Performed by: NURSE PRACTITIONER

## 2023-10-19 PROCEDURE — 85027 COMPLETE CBC AUTOMATED: CPT | Mod: ORL | Performed by: NURSE PRACTITIONER

## 2023-10-19 PROCEDURE — P9603 ONE-WAY ALLOW PRORATED MILES: HCPCS | Mod: ORL | Performed by: NURSE PRACTITIONER

## 2023-10-19 PROCEDURE — 80048 BASIC METABOLIC PNL TOTAL CA: CPT | Mod: ORL | Performed by: NURSE PRACTITIONER

## 2023-10-22 ENCOUNTER — LAB REQUISITION (OUTPATIENT)
Dept: LAB | Facility: CLINIC | Age: 88
End: 2023-10-22
Payer: MEDICARE

## 2023-10-22 DIAGNOSIS — E53.8 DEFICIENCY OF OTHER SPECIFIED B GROUP VITAMINS: ICD-10-CM

## 2023-10-24 LAB — VIT B12 SERPL-MCNC: 1884 PG/ML (ref 232–1245)

## 2023-10-24 PROCEDURE — P9604 ONE-WAY ALLOW PRORATED TRIP: HCPCS | Mod: ORL | Performed by: FAMILY MEDICINE

## 2023-10-24 PROCEDURE — 36415 COLL VENOUS BLD VENIPUNCTURE: CPT | Mod: ORL | Performed by: FAMILY MEDICINE

## 2023-10-24 PROCEDURE — 82607 VITAMIN B-12: CPT | Mod: ORL | Performed by: FAMILY MEDICINE

## 2024-03-25 ENCOUNTER — LAB REQUISITION (OUTPATIENT)
Dept: LAB | Facility: CLINIC | Age: 89
End: 2024-03-25
Payer: MEDICARE

## 2024-03-25 DIAGNOSIS — E03.9 HYPOTHYROIDISM, UNSPECIFIED: ICD-10-CM

## 2024-03-25 DIAGNOSIS — I48.20 CHRONIC ATRIAL FIBRILLATION, UNSPECIFIED (H): ICD-10-CM

## 2024-03-26 LAB
ANION GAP SERPL CALCULATED.3IONS-SCNC: 11 MMOL/L (ref 7–15)
BUN SERPL-MCNC: 30 MG/DL (ref 8–23)
CALCIUM SERPL-MCNC: 8.5 MG/DL (ref 8.8–10.2)
CHLORIDE SERPL-SCNC: 109 MMOL/L (ref 98–107)
CREAT SERPL-MCNC: 1.41 MG/DL (ref 0.51–0.95)
DEPRECATED HCO3 PLAS-SCNC: 21 MMOL/L (ref 22–29)
EGFRCR SERPLBLD CKD-EPI 2021: 36 ML/MIN/1.73M2
ERYTHROCYTE [DISTWIDTH] IN BLOOD BY AUTOMATED COUNT: 13.5 % (ref 10–15)
GLUCOSE SERPL-MCNC: 84 MG/DL (ref 70–99)
HCT VFR BLD AUTO: 34.6 % (ref 35–47)
HGB BLD-MCNC: 11 G/DL (ref 11.7–15.7)
MCH RBC QN AUTO: 29.3 PG (ref 26.5–33)
MCHC RBC AUTO-ENTMCNC: 31.8 G/DL (ref 31.5–36.5)
MCV RBC AUTO: 92 FL (ref 78–100)
PLATELET # BLD AUTO: 244 10E3/UL (ref 150–450)
POTASSIUM SERPL-SCNC: 4.4 MMOL/L (ref 3.4–5.3)
RBC # BLD AUTO: 3.75 10E6/UL (ref 3.8–5.2)
SODIUM SERPL-SCNC: 141 MMOL/L (ref 135–145)
TSH SERPL DL<=0.005 MIU/L-ACNC: 4.57 UIU/ML (ref 0.3–4.2)
WBC # BLD AUTO: 8.2 10E3/UL (ref 4–11)

## 2024-03-26 PROCEDURE — 84443 ASSAY THYROID STIM HORMONE: CPT | Mod: ORL | Performed by: NURSE PRACTITIONER

## 2024-03-26 PROCEDURE — 80048 BASIC METABOLIC PNL TOTAL CA: CPT | Mod: ORL | Performed by: NURSE PRACTITIONER

## 2024-03-26 PROCEDURE — 36415 COLL VENOUS BLD VENIPUNCTURE: CPT | Mod: ORL | Performed by: NURSE PRACTITIONER

## 2024-03-26 PROCEDURE — P9604 ONE-WAY ALLOW PRORATED TRIP: HCPCS | Mod: ORL | Performed by: NURSE PRACTITIONER

## 2024-03-26 PROCEDURE — 85027 COMPLETE CBC AUTOMATED: CPT | Mod: ORL | Performed by: NURSE PRACTITIONER

## 2024-06-04 ENCOUNTER — LAB REQUISITION (OUTPATIENT)
Dept: LAB | Facility: CLINIC | Age: 89
End: 2024-06-04
Payer: MEDICARE

## 2024-06-04 LAB — C DIFF TOX B STL QL: NEGATIVE

## 2024-06-04 PROCEDURE — 87493 C DIFF AMPLIFIED PROBE: CPT | Mod: ORL | Performed by: NURSE PRACTITIONER

## 2024-06-22 ENCOUNTER — LAB REQUISITION (OUTPATIENT)
Dept: LAB | Facility: CLINIC | Age: 89
End: 2024-06-22
Payer: MEDICARE

## 2024-06-22 LAB — C DIFF TOX B STL QL: NEGATIVE

## 2024-06-22 PROCEDURE — 87493 C DIFF AMPLIFIED PROBE: CPT | Mod: ORL | Performed by: FAMILY MEDICINE

## 2024-08-22 ENCOUNTER — LAB REQUISITION (OUTPATIENT)
Dept: LAB | Facility: CLINIC | Age: 89
End: 2024-08-22
Payer: MEDICARE

## 2024-08-22 DIAGNOSIS — R60.9 EDEMA, UNSPECIFIED: ICD-10-CM

## 2024-08-27 LAB
ANION GAP SERPL CALCULATED.3IONS-SCNC: 10 MMOL/L (ref 7–15)
BUN SERPL-MCNC: 29.7 MG/DL (ref 8–23)
CALCIUM SERPL-MCNC: 9 MG/DL (ref 8.8–10.4)
CHLORIDE SERPL-SCNC: 107 MMOL/L (ref 98–107)
CREAT SERPL-MCNC: 1.68 MG/DL (ref 0.51–0.95)
EGFRCR SERPLBLD CKD-EPI 2021: 29 ML/MIN/1.73M2
GLUCOSE SERPL-MCNC: 90 MG/DL (ref 70–99)
HCO3 SERPL-SCNC: 24 MMOL/L (ref 22–29)
POTASSIUM SERPL-SCNC: 4.5 MMOL/L (ref 3.4–5.3)
SODIUM SERPL-SCNC: 141 MMOL/L (ref 135–145)

## 2024-08-27 PROCEDURE — 80048 BASIC METABOLIC PNL TOTAL CA: CPT | Mod: ORL | Performed by: NURSE PRACTITIONER

## 2024-08-27 PROCEDURE — P9604 ONE-WAY ALLOW PRORATED TRIP: HCPCS | Mod: ORL | Performed by: NURSE PRACTITIONER

## 2024-08-27 PROCEDURE — 36415 COLL VENOUS BLD VENIPUNCTURE: CPT | Mod: ORL | Performed by: NURSE PRACTITIONER

## 2024-10-11 ENCOUNTER — LAB REQUISITION (OUTPATIENT)
Dept: LAB | Facility: CLINIC | Age: 89
End: 2024-10-11
Payer: MEDICARE

## 2024-10-11 DIAGNOSIS — Z79.01 LONG TERM (CURRENT) USE OF ANTICOAGULANTS: ICD-10-CM

## 2024-10-11 DIAGNOSIS — N18.32 CHRONIC KIDNEY DISEASE, STAGE 3B (H): ICD-10-CM

## 2024-10-15 LAB
ANION GAP SERPL CALCULATED.3IONS-SCNC: 9 MMOL/L (ref 7–15)
BUN SERPL-MCNC: 36.4 MG/DL (ref 8–23)
CALCIUM SERPL-MCNC: 8.9 MG/DL (ref 8.8–10.4)
CHLORIDE SERPL-SCNC: 109 MMOL/L (ref 98–107)
CREAT SERPL-MCNC: 1.74 MG/DL (ref 0.51–0.95)
EGFRCR SERPLBLD CKD-EPI 2021: 28 ML/MIN/1.73M2
ERYTHROCYTE [DISTWIDTH] IN BLOOD BY AUTOMATED COUNT: 14.4 % (ref 10–15)
GLUCOSE SERPL-MCNC: 93 MG/DL (ref 70–99)
HCO3 SERPL-SCNC: 21 MMOL/L (ref 22–29)
HCT VFR BLD AUTO: 32.9 % (ref 35–47)
HGB BLD-MCNC: 10.4 G/DL (ref 11.7–15.7)
MCH RBC QN AUTO: 28.8 PG (ref 26.5–33)
MCHC RBC AUTO-ENTMCNC: 31.6 G/DL (ref 31.5–36.5)
MCV RBC AUTO: 91 FL (ref 78–100)
PLATELET # BLD AUTO: 261 10E3/UL (ref 150–450)
POTASSIUM SERPL-SCNC: 4.6 MMOL/L (ref 3.4–5.3)
RBC # BLD AUTO: 3.61 10E6/UL (ref 3.8–5.2)
SODIUM SERPL-SCNC: 139 MMOL/L (ref 135–145)
WBC # BLD AUTO: 8.6 10E3/UL (ref 4–11)

## 2024-10-15 PROCEDURE — 36415 COLL VENOUS BLD VENIPUNCTURE: CPT | Mod: ORL | Performed by: NURSE PRACTITIONER

## 2024-10-15 PROCEDURE — 80048 BASIC METABOLIC PNL TOTAL CA: CPT | Mod: ORL | Performed by: NURSE PRACTITIONER

## 2024-10-15 PROCEDURE — 85027 COMPLETE CBC AUTOMATED: CPT | Mod: ORL | Performed by: NURSE PRACTITIONER

## 2024-10-15 PROCEDURE — P9604 ONE-WAY ALLOW PRORATED TRIP: HCPCS | Mod: ORL | Performed by: NURSE PRACTITIONER

## 2025-01-16 ENCOUNTER — LAB REQUISITION (OUTPATIENT)
Dept: LAB | Facility: CLINIC | Age: OVER 89
End: 2025-01-16
Payer: MEDICARE

## 2025-01-16 DIAGNOSIS — R19.7 DIARRHEA, UNSPECIFIED: ICD-10-CM

## 2025-01-16 LAB
FLUAV RNA SPEC QL NAA+PROBE: NEGATIVE
FLUBV RNA RESP QL NAA+PROBE: NEGATIVE
RSV RNA SPEC NAA+PROBE: NEGATIVE
SARS-COV-2 RNA RESP QL NAA+PROBE: NEGATIVE

## 2025-01-16 PROCEDURE — 87637 SARSCOV2&INF A&B&RSV AMP PRB: CPT | Mod: ORL | Performed by: NURSE PRACTITIONER

## 2025-01-23 ENCOUNTER — LAB REQUISITION (OUTPATIENT)
Dept: LAB | Facility: CLINIC | Age: OVER 89
End: 2025-01-23
Payer: MEDICARE

## 2025-01-23 DIAGNOSIS — R19.7 DIARRHEA, UNSPECIFIED: ICD-10-CM

## 2025-01-23 LAB — C DIFF TOX B STL QL: NEGATIVE

## 2025-01-23 PROCEDURE — 87507 IADNA-DNA/RNA PROBE TQ 12-25: CPT | Mod: ORL | Performed by: FAMILY MEDICINE

## 2025-01-23 PROCEDURE — 87493 C DIFF AMPLIFIED PROBE: CPT | Mod: ORL | Performed by: FAMILY MEDICINE

## 2025-01-24 LAB

## 2025-03-25 ENCOUNTER — LAB REQUISITION (OUTPATIENT)
Dept: LAB | Facility: CLINIC | Age: OVER 89
End: 2025-03-25
Payer: MEDICARE

## 2025-03-25 PROCEDURE — 87635 SARS-COV-2 COVID-19 AMP PRB: CPT | Mod: ORL | Performed by: NURSE PRACTITIONER

## 2025-03-26 LAB — SARS-COV-2 RNA RESP QL NAA+PROBE: NEGATIVE

## 2025-03-27 ENCOUNTER — LAB REQUISITION (OUTPATIENT)
Dept: LAB | Facility: CLINIC | Age: OVER 89
End: 2025-03-27
Payer: MEDICARE

## 2025-03-27 DIAGNOSIS — E03.9 HYPOTHYROIDISM, UNSPECIFIED: ICD-10-CM

## 2025-03-28 LAB — TSH SERPL DL<=0.005 MIU/L-ACNC: 4.54 UIU/ML (ref 0.3–4.2)

## 2025-03-28 PROCEDURE — P9604 ONE-WAY ALLOW PRORATED TRIP: HCPCS | Mod: ORL | Performed by: NURSE PRACTITIONER

## 2025-03-28 PROCEDURE — 36415 COLL VENOUS BLD VENIPUNCTURE: CPT | Mod: ORL | Performed by: NURSE PRACTITIONER

## 2025-03-28 PROCEDURE — 84443 ASSAY THYROID STIM HORMONE: CPT | Mod: ORL | Performed by: NURSE PRACTITIONER

## 2025-04-15 ENCOUNTER — LAB REQUISITION (OUTPATIENT)
Dept: LAB | Facility: CLINIC | Age: OVER 89
End: 2025-04-15
Payer: MEDICARE

## 2025-04-15 DIAGNOSIS — N18.32 CHRONIC KIDNEY DISEASE, STAGE 3B (H): ICD-10-CM

## 2025-04-17 LAB
ANION GAP SERPL CALCULATED.3IONS-SCNC: 9 MMOL/L (ref 7–15)
BUN SERPL-MCNC: 28.5 MG/DL (ref 8–23)
CALCIUM SERPL-MCNC: 8.8 MG/DL (ref 8.8–10.4)
CHLORIDE SERPL-SCNC: 107 MMOL/L (ref 98–107)
CREAT SERPL-MCNC: 1.46 MG/DL (ref 0.51–0.95)
EGFRCR SERPLBLD CKD-EPI 2021: 34 ML/MIN/1.73M2
GLUCOSE SERPL-MCNC: 94 MG/DL (ref 70–99)
HCO3 SERPL-SCNC: 22 MMOL/L (ref 22–29)
POTASSIUM SERPL-SCNC: 4.3 MMOL/L (ref 3.4–5.3)
SODIUM SERPL-SCNC: 138 MMOL/L (ref 135–145)

## 2025-04-17 PROCEDURE — P9604 ONE-WAY ALLOW PRORATED TRIP: HCPCS | Mod: ORL | Performed by: NURSE PRACTITIONER

## 2025-04-17 PROCEDURE — 36415 COLL VENOUS BLD VENIPUNCTURE: CPT | Mod: ORL | Performed by: NURSE PRACTITIONER

## 2025-04-17 PROCEDURE — 80048 BASIC METABOLIC PNL TOTAL CA: CPT | Mod: ORL | Performed by: NURSE PRACTITIONER
